# Patient Record
Sex: FEMALE | Race: WHITE | NOT HISPANIC OR LATINO | Employment: OTHER | URBAN - METROPOLITAN AREA
[De-identification: names, ages, dates, MRNs, and addresses within clinical notes are randomized per-mention and may not be internally consistent; named-entity substitution may affect disease eponyms.]

---

## 2022-11-07 ENCOUNTER — OUTSIDE PLACE OF SERVICE (OUTPATIENT)
Dept: SURGERY | Facility: CLINIC | Age: 82
End: 2022-11-07
Payer: MEDICARE

## 2022-11-07 ENCOUNTER — OUTSIDE PLACE OF SERVICE (OUTPATIENT)
Dept: SURGERY | Facility: CLINIC | Age: 82
End: 2022-11-07

## 2022-11-07 PROCEDURE — 44005 FREEING OF BOWEL ADHESION: CPT | Mod: ,,, | Performed by: SURGERY

## 2022-11-07 PROCEDURE — 99222 PR INITIAL HOSPITAL CARE,LEVL II: ICD-10-PCS | Mod: 57,,, | Performed by: SURGERY

## 2022-11-07 PROCEDURE — 99222 1ST HOSP IP/OBS MODERATE 55: CPT | Mod: 57,,, | Performed by: SURGERY

## 2022-11-07 PROCEDURE — 44005 PR FREEING BOWEL ADHESION,ENTEROLYSIS: ICD-10-PCS | Mod: ,,, | Performed by: SURGERY

## 2022-11-08 ENCOUNTER — OUTSIDE PLACE OF SERVICE (OUTPATIENT)
Dept: PULMONOLOGY | Facility: CLINIC | Age: 82
End: 2022-11-08
Payer: MEDICARE

## 2022-11-08 PROCEDURE — 99291 PR CRITICAL CARE, E/M 30-74 MINUTES: ICD-10-PCS | Mod: ,,, | Performed by: INTERNAL MEDICINE

## 2022-11-08 PROCEDURE — 99291 CRITICAL CARE FIRST HOUR: CPT | Mod: ,,, | Performed by: INTERNAL MEDICINE

## 2022-11-09 ENCOUNTER — OUTSIDE PLACE OF SERVICE (OUTPATIENT)
Dept: PULMONOLOGY | Facility: CLINIC | Age: 82
End: 2022-11-09
Payer: MEDICARE

## 2022-11-09 PROCEDURE — 99233 PR SUBSEQUENT HOSPITAL CARE,LEVL III: ICD-10-PCS | Mod: ,,, | Performed by: INTERNAL MEDICINE

## 2022-11-09 PROCEDURE — 99233 SBSQ HOSP IP/OBS HIGH 50: CPT | Mod: ,,, | Performed by: INTERNAL MEDICINE

## 2023-03-05 ENCOUNTER — TELEPHONE (OUTPATIENT)
Dept: GASTROENTEROLOGY | Facility: CLINIC | Age: 83
End: 2023-03-05

## 2023-03-05 NOTE — TELEPHONE ENCOUNTER
Add on to 6/7/2023 Wednesday OV with me at 2:15pm. Notify patient. No prior GI records in [facility] or [facility].    Will evaluate for MRI with pancreatic and MRCP protocols given dilated bile ducts and prior abnormal LFT.  NBP

## 2023-04-21 ENCOUNTER — OUTSIDE PLACE OF SERVICE (OUTPATIENT)
Dept: PULMONOLOGY | Facility: CLINIC | Age: 83
End: 2023-04-21
Payer: MEDICARE

## 2023-04-21 PROCEDURE — 99223 PR INITIAL HOSPITAL CARE,LEVL III: ICD-10-PCS | Mod: ,,, | Performed by: INTERNAL MEDICINE

## 2023-04-21 PROCEDURE — 99223 1ST HOSP IP/OBS HIGH 75: CPT | Mod: ,,, | Performed by: INTERNAL MEDICINE

## 2023-04-24 ENCOUNTER — OUTSIDE PLACE OF SERVICE (OUTPATIENT)
Dept: PULMONOLOGY | Facility: CLINIC | Age: 83
End: 2023-04-24
Payer: MEDICARE

## 2023-04-24 PROCEDURE — 99291 CRITICAL CARE FIRST HOUR: CPT | Mod: ,,, | Performed by: INTERNAL MEDICINE

## 2023-04-24 PROCEDURE — 99291 PR CRITICAL CARE, E/M 30-74 MINUTES: ICD-10-PCS | Mod: ,,, | Performed by: INTERNAL MEDICINE

## 2023-04-25 ENCOUNTER — OUTSIDE PLACE OF SERVICE (OUTPATIENT)
Dept: PULMONOLOGY | Facility: CLINIC | Age: 83
End: 2023-04-25
Payer: MEDICARE

## 2023-04-25 PROCEDURE — 99233 SBSQ HOSP IP/OBS HIGH 50: CPT | Mod: FS,,, | Performed by: INTERNAL MEDICINE

## 2023-04-25 PROCEDURE — 99233 PR SUBSEQUENT HOSPITAL CARE,LEVL III: ICD-10-PCS | Mod: FS,,, | Performed by: INTERNAL MEDICINE

## 2023-04-26 ENCOUNTER — OUTSIDE PLACE OF SERVICE (OUTPATIENT)
Dept: PULMONOLOGY | Facility: CLINIC | Age: 83
End: 2023-04-26
Payer: MEDICARE

## 2023-04-26 PROCEDURE — 99232 SBSQ HOSP IP/OBS MODERATE 35: CPT | Mod: FS,,, | Performed by: INTERNAL MEDICINE

## 2023-04-26 PROCEDURE — 99232 PR SUBSEQUENT HOSPITAL CARE,LEVL II: ICD-10-PCS | Mod: FS,,, | Performed by: INTERNAL MEDICINE

## 2024-02-29 ENCOUNTER — TELEPHONE (OUTPATIENT)
Dept: UROLOGY | Facility: CLINIC | Age: 84
End: 2024-02-29
Payer: MEDICARE

## 2024-03-04 ENCOUNTER — OFFICE VISIT (OUTPATIENT)
Dept: UROLOGY | Facility: CLINIC | Age: 84
End: 2024-03-04
Payer: MEDICARE

## 2024-03-04 VITALS
HEART RATE: 85 BPM | TEMPERATURE: 98 F | SYSTOLIC BLOOD PRESSURE: 150 MMHG | WEIGHT: 137 LBS | OXYGEN SATURATION: 98 % | DIASTOLIC BLOOD PRESSURE: 84 MMHG | HEIGHT: 62 IN | BODY MASS INDEX: 25.21 KG/M2

## 2024-03-04 DIAGNOSIS — N28.89 DILATION OF RENAL COLLECTION SYSTEM: ICD-10-CM

## 2024-03-04 DIAGNOSIS — N28.1 RENAL CYST: Primary | ICD-10-CM

## 2024-03-04 DIAGNOSIS — R35.0 URINARY FREQUENCY: ICD-10-CM

## 2024-03-04 DIAGNOSIS — N13.30 HYDRONEPHROSIS, UNSPECIFIED HYDRONEPHROSIS TYPE: ICD-10-CM

## 2024-03-04 DIAGNOSIS — R35.1 NOCTURIA: ICD-10-CM

## 2024-03-04 LAB
BILIRUBIN, UA POC OHS: NEGATIVE
BLOOD, UA POC OHS: ABNORMAL
CLARITY, UA POC OHS: CLEAR
COLOR, UA POC OHS: YELLOW
GLUCOSE, UA POC OHS: NEGATIVE
KETONES, UA POC OHS: NEGATIVE
LEUKOCYTES, UA POC OHS: NEGATIVE
NITRITE, UA POC OHS: NEGATIVE
PH, UA POC OHS: 7
PROTEIN, UA POC OHS: NEGATIVE
SPECIFIC GRAVITY, UA POC OHS: 1.01
UROBILINOGEN, UA POC OHS: 0.2

## 2024-03-04 PROCEDURE — 99204 OFFICE O/P NEW MOD 45 MIN: CPT | Mod: S$GLB,,, | Performed by: NURSE PRACTITIONER

## 2024-03-04 PROCEDURE — 81003 URINALYSIS AUTO W/O SCOPE: CPT | Mod: QW,S$GLB,, | Performed by: NURSE PRACTITIONER

## 2024-03-04 RX ORDER — SUCRALFATE 1 G/10ML
1 SUSPENSION ORAL 4 TIMES DAILY
COMMUNITY
End: 2024-03-28

## 2024-03-04 RX ORDER — FLUTICASONE PROPIONATE 50 MCG
1 SPRAY, SUSPENSION (ML) NASAL DAILY
COMMUNITY
End: 2024-03-28

## 2024-03-04 RX ORDER — PANTOPRAZOLE SODIUM 40 MG/1
40 TABLET, DELAYED RELEASE ORAL DAILY
COMMUNITY
End: 2024-03-28

## 2024-03-04 RX ORDER — SPIRONOLACTONE 25 MG/1
25 TABLET ORAL DAILY
COMMUNITY
End: 2024-03-28

## 2024-03-04 RX ORDER — ALBUTEROL SULFATE 0.63 MG/3ML
0.63 SOLUTION RESPIRATORY (INHALATION) EVERY 6 HOURS PRN
COMMUNITY
End: 2024-05-06

## 2024-03-04 RX ORDER — SERTRALINE HYDROCHLORIDE 25 MG/1
50 TABLET, FILM COATED ORAL DAILY
COMMUNITY

## 2024-03-04 RX ORDER — FUROSEMIDE 20 MG/1
20 TABLET ORAL 2 TIMES DAILY
COMMUNITY

## 2024-03-04 RX ORDER — VALSARTAN 160 MG/1
80 TABLET ORAL DAILY
COMMUNITY

## 2024-03-04 RX ORDER — MONTELUKAST SODIUM 10 MG/1
10 TABLET ORAL NIGHTLY
COMMUNITY

## 2024-03-04 RX ORDER — ATORVASTATIN CALCIUM 40 MG/1
40 TABLET, FILM COATED ORAL DAILY
COMMUNITY

## 2024-03-04 RX ORDER — BUDESONIDE 0.5 MG/2ML
0.5 INHALANT ORAL DAILY
COMMUNITY
End: 2024-05-06

## 2024-03-04 RX ORDER — CLONIDINE 0.1 MG/24H
1 PATCH, EXTENDED RELEASE TRANSDERMAL
COMMUNITY
End: 2024-03-28

## 2024-03-04 RX ORDER — NAPROXEN SODIUM 220 MG/1
81 TABLET, FILM COATED ORAL DAILY
COMMUNITY

## 2024-03-04 RX ORDER — LEVOTHYROXINE SODIUM 25 UG/1
25 TABLET ORAL
COMMUNITY

## 2024-03-04 RX ORDER — BENZONATATE 200 MG/1
200 CAPSULE ORAL 3 TIMES DAILY PRN
COMMUNITY
End: 2024-03-28

## 2024-03-04 RX ORDER — METOCLOPRAMIDE 10 MG/1
10 TABLET ORAL 4 TIMES DAILY
COMMUNITY
End: 2024-03-28

## 2024-03-04 RX ORDER — CARBAMAZEPINE 100 MG/1
100 TABLET, EXTENDED RELEASE ORAL 2 TIMES DAILY
COMMUNITY

## 2024-03-04 RX ORDER — AMLODIPINE BESYLATE 2.5 MG/1
2.5 TABLET ORAL DAILY
COMMUNITY

## 2024-03-04 RX ORDER — MIRABEGRON 25 MG/1
25 TABLET, FILM COATED, EXTENDED RELEASE ORAL DAILY
COMMUNITY

## 2024-03-04 RX ORDER — MAGNESIUM SULFATE 100 %
CRYSTALS MISCELLANEOUS
COMMUNITY
End: 2024-03-28

## 2024-03-12 ENCOUNTER — OUTSIDE PLACE OF SERVICE (OUTPATIENT)
Dept: GASTROENTEROLOGY | Facility: CLINIC | Age: 84
End: 2024-03-12
Payer: MEDICARE

## 2024-03-12 PROCEDURE — 99223 1ST HOSP IP/OBS HIGH 75: CPT | Mod: ,,, | Performed by: INTERNAL MEDICINE

## 2024-03-13 ENCOUNTER — OUTSIDE PLACE OF SERVICE (OUTPATIENT)
Dept: GASTROENTEROLOGY | Facility: CLINIC | Age: 84
End: 2024-03-13
Payer: MEDICARE

## 2024-03-13 PROCEDURE — 43235 EGD DIAGNOSTIC BRUSH WASH: CPT | Mod: ,,, | Performed by: INTERNAL MEDICINE

## 2024-03-14 ENCOUNTER — TELEPHONE (OUTPATIENT)
Dept: GASTROENTEROLOGY | Facility: CLINIC | Age: 84
End: 2024-03-14
Payer: MEDICARE

## 2024-03-14 RX ORDER — PANCRELIPASE LIPASE, PANCRELIPASE PROTEASE, PANCRELIPASE AMYLASE 10000; 32000; 42000 [USP'U]/1; [USP'U]/1; [USP'U]/1
1 CAPSULE, DELAYED RELEASE ORAL
Qty: 90 CAPSULE | Refills: 0 | Status: SHIPPED | OUTPATIENT
Start: 2024-03-14 | End: 2024-04-04 | Stop reason: SDUPTHER

## 2024-03-14 NOTE — TELEPHONE ENCOUNTER
----- Message from Ileana Lundberg sent at 3/14/2024  3:27 PM CDT -----  Type:  Needs Medical Advice    Who Called: Rochelle Mckeon ( pt's dtr, Madhavi)     Symptoms (please be specific): -   How long has patient had these symptoms:  -  Pharmacy name and phone #:  -  Would the patient rather a call back or a response via [facility]?    Best Call Back Number: [phone]  Additional Information: needs to speak w/ nurse about a medication that pt was d/c'with but it hasn't been called into pharmacy please call to advise

## 2024-03-14 NOTE — TELEPHONE ENCOUNTER
I called and spoke to patients daughter regarding follow up with Dr. Paz. Patient daughter states patient is ok at the present moment. Please advise when you would like for patient to be scheduled and with which provider?

## 2024-03-14 NOTE — TELEPHONE ENCOUNTER
Pt given Zenpep (20,000) in hospital. Was not sent home with a prescription for it though. Pt prefers [facility], but her daughter did say if it is a medication that isnt commonly kept in the pharmacy then can send to [facility] in [place]. Both added to pts chart. -KG

## 2024-03-14 NOTE — TELEPHONE ENCOUNTER
Make 2 weeks follow up OV with NP.  Zenpep 10,000 sent to [facility]. Not sure if they have on supply. If not, then [facility]. Okay to  samples to try up until NP follow up OV if preferred if we have any samples.   NBP

## 2024-03-14 NOTE — TELEPHONE ENCOUNTER
----- Message from Jeannine Goel sent at 3/14/2024 12:39 PM CDT -----  Regarding: Appointment  Contact: [facility]  Per phone call with Carin, the patient was discharge on 03/14/2024 and the patient needs to be schedule for a hospital follow up.  Please return call at [phone] (home).    Thanks,  SJ

## 2024-03-18 ENCOUNTER — TELEPHONE (OUTPATIENT)
Dept: GASTROENTEROLOGY | Facility: CLINIC | Age: 84
End: 2024-03-18

## 2024-03-18 ENCOUNTER — TELEPHONE (OUTPATIENT)
Dept: GASTROENTEROLOGY | Facility: CLINIC | Age: 84
End: 2024-03-18
Payer: MEDICARE

## 2024-03-18 NOTE — TELEPHONE ENCOUNTER
Patients mother was calling to ask if there is anything she can take for diarrhea. Patient stated they did  prescription from the pharmacy. 3/18/24 LRA         ----- Message from Jeannine Goel sent at 3/18/2024  3:43 PM CDT -----  Regarding: Return Call  Contact: Madhavi, Daughter  Type:  Patient Returning Call    Who Called:Madhavi  Who Left Message for Patient: Jessica   Does the patient know what this is regarding?: medication   Would the patient rather a call back or a response via [facility]?  Call back   Best Call Back Number: [phone] (home)     Additional Information:     CLAUDIO Carr

## 2024-03-18 NOTE — TELEPHONE ENCOUNTER
Was patient able to  Zenpep from pharmacy? Medication is to be taken with meals and should help diarrhea. We can see if we have samples if she did not  from pharmacy.  Shruthi

## 2024-03-18 NOTE — TELEPHONE ENCOUNTER
----- Message from Jeannine Goel sent at 3/18/2024  8:31 AM CDT -----  Regarding: Medication  Contact: Madhavi,daughter  Per phone call with Madhavi, she stated that she was released from the hospital on Thursday and she is still diarrhea and taking the Immodiaun and she wants to know if something else can be called in.  Please return call at [phone] (home).    Thanks,  SJ

## 2024-03-22 ENCOUNTER — TELEPHONE (OUTPATIENT)
Dept: UROLOGY | Facility: CLINIC | Age: 84
End: 2024-03-22
Payer: MEDICARE

## 2024-03-22 NOTE — TELEPHONE ENCOUNTER
Confirm that she is taking zenpep with meals. And that she has already tried OTC imodium. Would also recommend stopping metoclopramide (Reglan) if not already.  If she has tried all above she may come by and have samples of Xifaxan. Can provide 6 days worth. She will take 1 tablet 3 times daily. Keep OV next week.  KN

## 2024-03-22 NOTE — TELEPHONE ENCOUNTER
"Spoke with patient's daughter (Madhavi) in regards to the patient's renal scan. Patient was unable to make the scheduled appointment due to being hospitalized r/t GI issues. Syed states " I wanted to call and update ya'll so we didn't leave yall high and dry with no communication, but as soon as she's feeling better we will get this scan scheduled and done" Encouraged patient to contact the clinic with any questions or concerns, if they need an order resent to please let the clinic know. Patient verbalized understanding.         ----- Message from China Kulkarni sent at 3/22/2024 10:27 AM CDT -----  Contact: Daughter/Madhavi  Patients daughter is calling to state pt had a renal scan scheduled but couldn't make it. Pt was hospitalized and is now home. Can be contacted at [phone] to reschedule.     "

## 2024-03-25 NOTE — TELEPHONE ENCOUNTER
Called patient and she stated she has tried everything above. Patient stated she will come by office to get Xifaxan. Patient understands how to take medication. 3/25/24 LRA

## 2024-03-28 ENCOUNTER — OFFICE VISIT (OUTPATIENT)
Dept: GASTROENTEROLOGY | Facility: CLINIC | Age: 84
End: 2024-03-28
Payer: MEDICARE

## 2024-03-28 VITALS
HEART RATE: 83 BPM | WEIGHT: 131.19 LBS | HEIGHT: 62 IN | OXYGEN SATURATION: 95 % | RESPIRATION RATE: 18 BRPM | DIASTOLIC BLOOD PRESSURE: 72 MMHG | BODY MASS INDEX: 24.14 KG/M2 | SYSTOLIC BLOOD PRESSURE: 122 MMHG

## 2024-03-28 DIAGNOSIS — K83.8 INTRAHEPATIC BILE DUCT DILATION: ICD-10-CM

## 2024-03-28 DIAGNOSIS — R63.4 WEIGHT LOSS: ICD-10-CM

## 2024-03-28 DIAGNOSIS — R93.3 IMAGING OF GASTROINTESTINAL TRACT ABNORMAL: ICD-10-CM

## 2024-03-28 DIAGNOSIS — R11.2 NAUSEA AND VOMITING, UNSPECIFIED VOMITING TYPE: ICD-10-CM

## 2024-03-28 DIAGNOSIS — K31.84 GASTROPARESIS: ICD-10-CM

## 2024-03-28 DIAGNOSIS — K83.8 COMMON BILE DUCT DILATATION: ICD-10-CM

## 2024-03-28 DIAGNOSIS — Z98.84 H/O GASTRIC BYPASS: ICD-10-CM

## 2024-03-28 DIAGNOSIS — R19.7 DIARRHEA, UNSPECIFIED TYPE: Primary | ICD-10-CM

## 2024-03-28 PROCEDURE — 99215 OFFICE O/P EST HI 40 MIN: CPT | Mod: S$GLB,,,

## 2024-03-28 RX ORDER — PANTOPRAZOLE SODIUM 40 MG/1
40 TABLET, DELAYED RELEASE ORAL 2 TIMES DAILY
Qty: 180 TABLET | Refills: 2 | Status: SHIPPED | OUTPATIENT
Start: 2024-03-28

## 2024-03-28 NOTE — LETTER
March 28, 2024        Leonardo Raman MD  [address]  [place] [address]             [place] [facility]  [address] [address]  Phone: [phone]  Fax: [phone]   Patient: Rochelle Mckeon   MR Number: [id number]   YOB: 1940   Date of Visit: 3/28/2024       Dear Dr. Raman:    Thank you for referring Rochelle Mckeon to me for evaluation. Attached you will find relevant portions of my assessment and plan of care.    If you have questions, please do not hesitate to call me. I look forward to following Rochelle Mckeon along with you.    Sincerely,      Olivia Hernandez, NP            CC  No Recipients    Enclosure

## 2024-03-28 NOTE — PATIENT INSTRUCTIONS
Begin taking pantoprazole 40 mg twice a day.   Try taking Carafate before meals and at bedtime.  Continue taking Zenpep but take 2 capsules with each meal.  If you get constipated, begin taking MiraLAX. Start with 1/2 capful daily, and titrate dose up or down until you are having daily, soft bowel movements.     Please notify my office if you have not been contacted within two weeks after any procedures, submitting any samples (biopsies, blood, stool, urine, etc.) or after any imaging (X-ray, CT, MRI, etc.).

## 2024-03-28 NOTE — PROGRESS NOTES
Clinic Note    Reason for visit:  The primary encounter diagnosis was Diarrhea, unspecified type. Diagnoses of Nausea and vomiting, unspecified vomiting type, Gastroparesis, Weight loss, Intrahepatic bile duct dilation, Common bile duct dilatation, Imaging of gastrointestinal tract abnormal, and H/O gastric bypass were also pertinent to this visit.    PCP: Leonardo Raman.       HPI:  This is a 83 y.o. female who was seen as inpatient by Dr. Paz 3/12/2024 for N/V and diarrhea. Daughter present during visit. She has been taking Zenpep since discharge and took Xifaxan TID for 6 days and she states her stools are no longer loose. Bowel movements seem to be under control. Her main complaint is nausea, which has been for the last 30 years. She is not vomiting but wakes up nauseous and is nauseous all day. She does not have an appetite. She has Zofran at home but doesn't seem to help. She takes a lot of medicine at night and patients daughter had her try taking them with an Ensure. Patient's daughter feels she is depressed due to  passing and being sick which is making symptoms. She eats the same thing daily, usually 2 pieces of toast for breakfast then half a sandwich for lunch and whatever her daughter cooks for supper. She gets full quickly. Denies dysphagia. She was previously on pantoprazole 40 mg daily but this was stopped while inpatient. Her daughter states in the past she would get constipated then go to diarrhea.     In review of Epic records- she has taken Bentyl, Dexilant, Trulance, and GI cocktail in the past.     She states she took Carafate two days and had diarrhea so she stopped it and is too scared to try it again.    She was on Imodium TID but she stopped this.      She was to have fecal elastase checked while inpatient but does not appear it was done.      She has h/o gastroparesis and had gastric bypass and has had multiple complications since that time. She had small bowel obstruction  with surgery intervention in 11/2022. Last colonoscopy possibly in 2018, unsure of date. Patient's daughter trying to get records.       CT AP IV 3/11/2024: stable intrahepatic/extrahepatic biliary ductal dilatation. Diverticulosis. Thickening of sigmoid. Possible ileus. Prominent main portal vein, fatty liver.    UGIS 5/12/2023: Postsurgical changes from a gastrojejunostomy with a widely patent surgical anastomosis. A complete small bowel follow-through could not be performed due to the patient's inability to ingest additional contrast. Esophageal dysmotility with dissipation of the primary peristaltic wave.       3/12/2024 GI stool PCR panel/C diff neg, fecal fat nl, CBC nl, 39H/45/132H, alb 3.2L, Tp 6.3L, CMP onl, TSH/FT4 nl, positive stool lactoferrin.  3/11/2024: CRP 0.60H, 84H/69H/170H, CMP onl, Lipase 102H (13-75), PT/INR/CBC nl    She has diarrhea and was given Zenpep at discharge and this has not helped her diarrhea.     Did not feel she could tolerate a prep for a colonoscopy.   She had EGD w/no structural source of anemia.  Suspected her loose stools are related to her bypass anatomy.  Would hope to avoid any further intervention such as colonoscopy if able in high-risk for complications related to her adhesions and comorbidities.     EGD 3/13/2024: Normal esophageal mucosa, esophagogastric junction, and proximal stomach. Surgically altered anatomy consistent with a prior gastroenteric anastomosis that has been closed with a 2nd patent gastroenteric anastomosis that appears to have an enteroenteric anastomosis just beyond the gastric lumen.  All appears well healed with normal overlying mucosa.    Records brought in by patient:  CT abd IV 2015: Fluid-filled thin-walled oversewn duodenal bulb noted. General dilatation of central biliary tree. Enlarged CBD visualized to ampulla region w/o evidence of CBD mass or stone. Postsurgical changes of stomach.     Small Bowel Series 2014: no evidence of small bowel  obstructive process. Abnormal appearance to RLQ distal ileal loops w/findings suggestive of tethering or scar.     CT Chest/Abd/Pelvis IV 8/2011: pulmonary nodule and mild patchy infiltrates of lung, Post op changes of distal stomach. No GB. Chronic changes of pancreas. Biliary prominence noted. Fatty liver. Diverticulosis w/o inflammation.     EGD with dilation of gastric outlet 2006: 10 mm opening in gastric outlet s/p balloon dilation to 20 mm. 2 weeks prior gastric outlet opening was 5-6 mm s/p dilation. Less edema than prior.     Review of Systems   Constitutional:  Positive for fatigue. Negative for fever and unexpected weight change.   HENT:  Negative for mouth sores, postnasal drip, sore throat and trouble swallowing.    Eyes:  Negative for pain, discharge and eye dryness.   Respiratory:  Positive for cough and shortness of breath. Negative for apnea, choking, chest tightness and wheezing.    Cardiovascular:  Negative for chest pain, palpitations and leg swelling.   Gastrointestinal:  Positive for change in bowel habit, constipation, diarrhea and nausea. Negative for abdominal distention, abdominal pain, anal bleeding, blood in stool, rectal pain, vomiting, reflux and fecal incontinence.   Genitourinary:  Negative for bladder incontinence, dysuria and hematuria.   Musculoskeletal:  Positive for back pain. Negative for arthralgias and joint swelling.   Integumentary:  Negative for color change and rash.   Allergic/Immunologic: Negative for environmental allergies and food allergies.   Neurological:  Negative for seizures and headaches.   Hematological:  Negative for adenopathy. Bruises/bleeds easily.        Past Medical History:   Diagnosis Date    Allergies     Aortic stenosis     CHF (congestive heart failure)     Gastroparesis     GERD (gastroesophageal reflux disease)     Heart disease     History of pulmonary embolism     History of small bowel obstruction     HTN (hypertension)     Hypoxia     Mixed  hyperlipidemia     Pneumonia, unspecified organism     Respiratory failure      Past Surgical History:   Procedure Laterality Date    ABDOMINAL ADHESION SURGERY      APPENDECTOMY      BLADDER SUSPENSION      CHOLECYSTECTOMY      EXPLORATORY LAPAROTOMY      closure of small bowel perforation    GASTRIC BYPASS      HYSTERECTOMY      REPAIR, LACERATION, ABDOMEN      SHOULDER OPEN ROTATOR CUFF REPAIR      VAGOTOMY  2006     History reviewed. No pertinent family history.  Social History     Tobacco Use    Smoking status: Never     Passive exposure: Never    Smokeless tobacco: Never   Substance Use Topics    Alcohol use: Never    Drug use: Never     Review of patient's allergies indicates:   Allergen Reactions    Codeine Other (See Comments)     Hyper      Medication List with Changes/Refills   New Medications    PANTOPRAZOLE (PROTONIX) 40 MG TABLET    Take 1 tablet (40 mg total) by mouth 2 (two) times daily.   Current Medications    ALBUTEROL (ACCUNEB) 0.63 MG/3 ML NEBU    Take 0.63 mg by nebulization every 6 (six) hours as needed. Rescue    AMLODIPINE (NORVASC) 2.5 MG TABLET    Take 2.5 mg by mouth once daily.    APIXABAN (ELIQUIS) 2.5 MG TAB    Take by mouth 2 (two) times daily.    ASPIRIN 81 MG CHEW    Take 81 mg by mouth once daily.    ATORVASTATIN (LIPITOR) 40 MG TABLET    Take 40 mg by mouth once daily.    BUDESONIDE (PULMICORT) 0.5 MG/2 ML NEBULIZER SOLUTION    Take 0.5 mg by nebulization once daily. Controller    CARBAMAZEPINE (TEGRETOL XR) 100 MG 12 HR TABLET    Take 100 mg by mouth 2 (two) times daily.    FUROSEMIDE (LASIX) 20 MG TABLET    Take 20 mg by mouth 2 (two) times daily.    LEVOTHYROXINE (SYNTHROID) 25 MCG TABLET    Take 25 mcg by mouth before breakfast.    LIPASE-PROTEASE-AMYLASE (ZENPEP) 10,000-32,000 -42,000 UNIT CPDR    Take 1 capsule by mouth 3 (three) times daily with meals.    MIRABEGRON (MYRBETRIQ) 25 MG TB24 ER TABLET    Take 25 mg by mouth once daily.    MONTELUKAST (SINGULAIR) 10 MG TABLET    " Take 10 mg by mouth every evening.    SERTRALINE (ZOLOFT) 25 MG TABLET    Take 50 mg by mouth once daily.    VALSARTAN (DIOVAN) 160 MG TABLET    Take 160 mg by mouth once daily.   Discontinued Medications    BENZONATATE (TESSALON) 200 MG CAPSULE    Take 200 mg by mouth 3 (three) times daily as needed for Cough.    CLONIDINE 0.1 MG/24 HR TD PTWK (CATAPRES) 0.1 MG/24 HR    Place 1 patch onto the skin every 7 days.    FLUTICASONE PROPIONATE (FLONASE) 50 MCG/ACTUATION NASAL SPRAY    1 spray by Each Nostril route once daily.    MAGNESIUM SULFATE 100 % CRYSTALS    by Misc.(Non-Drug; Combo Route) route.    METOCLOPRAMIDE HCL (REGLAN) 10 MG TABLET    Take 10 mg by mouth 4 (four) times daily.    PANTOPRAZOLE (PROTONIX) 40 MG TABLET    Take 40 mg by mouth once daily.    SPIRONOLACTONE (ALDACTONE) 25 MG TABLET    Take 25 mg by mouth once daily.    SUCRALFATE (CARAFATE) 100 MG/ML SUSPENSION    Take 1 g by mouth 4 (four) times daily.         Vital Signs:  /72 (BP Location: Left arm, Patient Position: Sitting, BP Method: Medium (Automatic))   Pulse 83   Resp 18   Ht 5' 2" (1.575 m)   Wt 59.5 kg (131 lb 3.2 oz)   SpO2 95%   BMI 24.00 kg/m²        Physical Exam  Vitals reviewed.   Constitutional:       General: She is awake. She is not in acute distress.     Appearance: Normal appearance. She is well-developed. She is not ill-appearing, toxic-appearing or diaphoretic.   HENT:      Head: Normocephalic and atraumatic.      Nose: Nose normal.      Mouth/Throat:      Mouth: Mucous membranes are moist.      Pharynx: Oropharynx is clear. No oropharyngeal exudate or posterior oropharyngeal erythema.   Eyes:      General: Lids are normal. Gaze aligned appropriately. No scleral icterus.        Right eye: No discharge.         Left eye: No discharge.      Conjunctiva/sclera: Conjunctivae normal.   Neck:      Trachea: Trachea normal.   Cardiovascular:      Rate and Rhythm: Normal rate and regular rhythm.      Pulses:           " Radial pulses are 2+ on the right side and 2+ on the left side.   Pulmonary:      Effort: Pulmonary effort is normal. No respiratory distress.      Breath sounds: No stridor. No wheezing.   Chest:      Chest wall: No tenderness.   Abdominal:      General: Bowel sounds are normal. There is no distension.      Palpations: Abdomen is soft. There is no fluid wave, hepatomegaly or mass.      Tenderness: There is no abdominal tenderness. There is no guarding or rebound.   Musculoskeletal:         General: No tenderness or deformity.      Cervical back: Full passive range of motion without pain and neck supple. No tenderness.      Right lower leg: No edema.      Left lower leg: No edema.   Lymphadenopathy:      Cervical: No cervical adenopathy.   Skin:     General: Skin is warm and dry.      Capillary Refill: Capillary refill takes less than 2 seconds.      Coloration: Skin is not cyanotic, jaundiced or pale.   Neurological:      General: No focal deficit present.      Mental Status: She is alert and oriented to person, place, and time.      Motor: Tremor (mild bilateral upper extremity) present.   Psychiatric:         Attention and Perception: Attention normal.         Mood and Affect: Mood and affect normal.         Speech: Speech normal.         Behavior: Behavior normal. Behavior is cooperative.            All of the data above and below has been reviewed by myself and any further interpretations will be reflected in the assessment and plan.   The data includes review of external notes, and independent interpretation of lab results, procedures, x-rays, and imaging reports.      Assessment:  Diarrhea, unspecified type    Nausea and vomiting, unspecified vomiting type    Gastroparesis    Weight loss    Intrahepatic bile duct dilation    Common bile duct dilatation    Imaging of gastrointestinal tract abnormal    H/O gastric bypass    Other orders  -     pantoprazole (PROTONIX) 40 MG tablet; Take 1 tablet (40 mg total) by  mouth 2 (two) times daily.  Dispense: 180 tablet; Refill: 2    Suspected her loose stools are related to her bypass anatomy.  Would hope to avoid any further intervention such as colonoscopy if able in high-risk for complications related to her adhesions and comorbidities. She also does not believe she could tolerate the prep for a colonoscopy at this time. Her diarrhea seems to be under control with Zenpep. She will increase Zenpep to 2 capsules at a time instead of 1.   She will try Carafate again.   Nausea likely related to gastroparesis and prior surgeries. Zenpep has not helped the nausea. She will begin taking pantoprazole 40 BID. Will review with Dr. Paz.   Encouraged her to supplement meals with Ensure/Boost.     Recommendations:  Begin taking pantoprazole 40 mg twice a day.   Try taking Carafate before meals and at bedtime.  Continue taking Zenpep but take 2 capsules with each meal.   If you get constipated, begin taking MiraLAX. Start with 1/2 capful daily, and titrate dose up or down until you are having daily, soft bowel movements.     Risks, benefits, and alternatives of medical management, any associated procedures, and/or treatment discussed with the patient. Patient given opportunity to ask questions and voices understanding. Patient has elected to proceed with the recommended care modalities as discussed.    Follow up with Dr. Paz.     Order summary:  No orders of the defined types were placed in this encounter.       Instructed patient to notify my office if they have not been contacted within two weeks after any procedures, submitting any samples (biopsies, blood, stool, urine, etc.) or after any imaging (X-ray, CT, MRI, etc.).      Olivia Hernandez NP    This document may have been created using a voice recognition transcribing system. Incorrect words or phrases may have been missed during proofreading. Please interpret accordingly or contact me for clarification.

## 2024-04-08 RX ORDER — PANCRELIPASE LIPASE, PANCRELIPASE PROTEASE, PANCRELIPASE AMYLASE 10000; 32000; 42000 [USP'U]/1; [USP'U]/1; [USP'U]/1
1 CAPSULE, DELAYED RELEASE ORAL
Qty: 90 CAPSULE | Refills: 0 | Status: CANCELLED | OUTPATIENT
Start: 2024-04-08

## 2024-04-08 NOTE — TELEPHONE ENCOUNTER
----- Message from Jeannine Goel sent at 2024  9:00 AM CDT -----  Regarding: Refill  Contact: Madhavi,daughter  Type:  RX Refill Request    Who Called: Madhavi   Refill or New Rx: new refill   RX Name and Strength: lipase-protease-amylase (ZENPEP) 10,000-32,000 -42,000 unit CpDR  How is the patient currently taking it? (ex. 1XDay):daily  Is this a 30 day or 90 day RX:90  Preferred Pharmacy with phone number:  [facility]/pharmacy #[id number] - [place], [place] - [address] AT [facility]  [address]  [place] [address]  Phone: [phone] Fax: [phone]      Local or Mail Order:local   Ordering Provider: Dr Domitila Paz   Would the patient rather a call back or a response via [facility]? Call back   Best Call Back Number: [phone] (home)     Additional Information: The prescription has     Thanks  SJ

## 2024-04-09 RX ORDER — PANCRELIPASE LIPASE, PANCRELIPASE PROTEASE, PANCRELIPASE AMYLASE 10000; 32000; 42000 [USP'U]/1; [USP'U]/1; [USP'U]/1
1 CAPSULE, DELAYED RELEASE ORAL
Qty: 90 CAPSULE | Refills: 0 | Status: SHIPPED | OUTPATIENT
Start: 2024-04-09 | End: 2024-04-09

## 2024-04-09 NOTE — TELEPHONE ENCOUNTER
I sent out Zenpep 25,000 lipase unit dose and she will take 1 capsule with each meal and 1 capsule with each snack. If she has the 10,000 lipase unit dose at home (which was the dose that was given to her previously), then she can take 2 of those capsules with each meal and snack.   Also, has she had any improvement in her symptoms since taking pantoprazole 40 mg twice a day? Did she try taking Carafate again?   Her nausea is most likely related to her prior surgeries and adhesions. She may need to try low residue diet.   May also consider MRI of her brain/brainstem to evaluate for central source of nausea if not completed before.     Low Residue Diet:   Avoid any food made with seeds, nuts, or raw or dried fruit.  Avoid whole-grain breads and cereals, purchase products made from refined white flour.  Do not eat raw fruits or vegetables and remove skins before cooking.  MLC

## 2024-04-10 NOTE — TELEPHONE ENCOUNTER
Pt daughter Madhavi came in to  samples of Zenpep 40,000 mg.. pt to take one w/meals and snacks  per [facility]...  darcin

## 2024-04-10 NOTE — TELEPHONE ENCOUNTER
Staff called back spoke to Madhavi pt daughter.. she advised the Zenpep is working and asking if we have any samples?  The pharmacy have to order it and she's having diarrhea now because she's out.     She also stated pt had a MRI last hosp stay 5/2023.    Staff printed MRI report from 4/2023 and scanned into chart for review    Staff spoke to [facility] she advised pt can get Pancreatic Enzymes w/25,000 units of Lipase OTC until the pharmacy get some OR she can come to office and get samples.     Pt advised she will to by [facility] to see if they have the OTC medication and if not she will come to the office for samples.    [facility] notified..  --francois

## 2024-04-16 ENCOUNTER — PATIENT MESSAGE (OUTPATIENT)
Dept: GASTROENTEROLOGY | Facility: CLINIC | Age: 84
End: 2024-04-16
Payer: MEDICARE

## 2024-04-17 NOTE — TELEPHONE ENCOUNTER
She may take one of the 25,000 dose capsules with a 10,000 dose capsule, making 35,000 units per dose. Next time she needs a refill we can send the 40,000 unit dose.   MLC

## 2024-04-26 NOTE — PROGRESS NOTES
[facility] Records:  Previously on Dexilant 60 mg daily, Baclofen 10 mg BID, Vistaril 10 mg TID, Tigan 300 mg prn, Linzess 145 mcg, and GI cocktail+Donnatal prn. Linzess 290 mcg caused diarrhea.   EGD w/Dr. Almanza 4/10/2024: Evidence of gastrojejunostomy found in gastric antrum, in prepyloric region of stomach and in pylorus. Healthy appearing patent anastomosis. Medium amount of food (residue) found in greater curvature of stomach. Gastroparesis from prior vagotomy. 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  EGD/Colonoscopy w/Dr. Almanza 3/10/2010: Previous Tanisha-En-Y gastroplasty with evidence of mild esophagitis. Suture granuloma noted in gastric antrum. Diffuse melanosis coli, liquid stool in entire examined colon, interfering with visualization. IH. Repeat colon in 10 yr. EBx foveolar oxyntic mucosa w/diffuse mod chr infl  MLC

## 2024-05-06 ENCOUNTER — OFFICE VISIT (OUTPATIENT)
Dept: UROLOGY | Facility: CLINIC | Age: 84
End: 2024-05-06
Payer: MEDICARE

## 2024-05-06 VITALS
BODY MASS INDEX: 23.92 KG/M2 | DIASTOLIC BLOOD PRESSURE: 74 MMHG | SYSTOLIC BLOOD PRESSURE: 138 MMHG | HEIGHT: 62 IN | WEIGHT: 130 LBS | RESPIRATION RATE: 12 BRPM | HEART RATE: 99 BPM

## 2024-05-06 DIAGNOSIS — N39.41 URGE INCONTINENCE: Primary | ICD-10-CM

## 2024-05-06 LAB
BILIRUBIN, UA POC OHS: NEGATIVE
BLOOD, UA POC OHS: ABNORMAL
CLARITY, UA POC OHS: CLEAR
COLOR, UA POC OHS: YELLOW
GLUCOSE, UA POC OHS: NEGATIVE
KETONES, UA POC OHS: NEGATIVE
LEUKOCYTES, UA POC OHS: NEGATIVE
NITRITE, UA POC OHS: NEGATIVE
PH, UA POC OHS: 7
PROTEIN, UA POC OHS: NEGATIVE
SPECIFIC GRAVITY, UA POC OHS: 1.02
UROBILINOGEN, UA POC OHS: 0.2

## 2024-05-06 PROCEDURE — 81003 URINALYSIS AUTO W/O SCOPE: CPT | Mod: QW,S$GLB,,

## 2024-05-06 PROCEDURE — 99214 OFFICE O/P EST MOD 30 MIN: CPT | Mod: S$GLB,,,

## 2024-05-06 RX ORDER — LOPERAMIDE HYDROCHLORIDE 2 MG/1
2 CAPSULE ORAL 4 TIMES DAILY PRN
COMMUNITY

## 2024-05-06 RX ORDER — MELATONIN 3 MG
3 CAPSULE ORAL NIGHTLY
COMMUNITY

## 2024-05-06 RX ORDER — CHOLECALCIFEROL (VITAMIN D3) 25 MCG
5000 TABLET ORAL DAILY
COMMUNITY

## 2024-05-06 RX ORDER — ONDANSETRON HYDROCHLORIDE 8 MG/1
8 TABLET, FILM COATED ORAL EVERY 8 HOURS PRN
COMMUNITY

## 2024-05-06 NOTE — PROGRESS NOTES
Subjective:       Patient ID: Rochelle Mckeon is a 83 y.o. female.    Chief Complaint: Cyst (fu)      HPI: A 3-year-old female established patient presents today for follow-up of medication dose adjustment.  Patient was previously on Myrbetric 25 mg however she was still having 2-3 episodes of nocturia at night with urge incontinence.  At her last visit she was provided with 50 mg Myrbetric samples however patient reports she noted there was no improvement with the dose adjustment so when she completed this sample she went back to taking her 25 mg tablets.  Today patient denies dysuria, hematuria, urinary frequency, odor, fever or chills.    Patient had a CT on 02/22/2024 due to abdominal pain.  CT shows bilateral renal cyst.  Also shows enlargement of the right renal pelvis which could be due to educated until UPJ stenosis or pelviectasis.  Last visit a nuclear med renal scan was ordered which showed Normal renal function with normal bilateral uptake and excretion. No evidence of obstruction.             Past Medical History:   Past Medical History:   Diagnosis Date    Allergies     Aortic stenosis     CHF (congestive heart failure)     Gastroparesis     GERD (gastroesophageal reflux disease)     Heart disease     History of pulmonary embolism     History of small bowel obstruction     HTN (hypertension)     Hypoxia     Mixed hyperlipidemia     Parkinson's disease     Pneumonia, unspecified organism     Respiratory failure        Past Surgical Historical:   Past Surgical History:   Procedure Laterality Date    ABDOMINAL ADHESION SURGERY      APPENDECTOMY      BLADDER SUSPENSION      CHOLECYSTECTOMY      EXPLORATORY LAPAROTOMY      closure of small bowel perforation    GASTRIC BYPASS      HYSTERECTOMY      REPAIR, LACERATION, ABDOMEN      SHOULDER OPEN ROTATOR CUFF REPAIR      VAGOTOMY  2006        Medications:   Medication List with Changes/Refills   Current Medications    AMLODIPINE (NORVASC) 2.5 MG TABLET    Take 2.5  mg by mouth once daily.    APIXABAN (ELIQUIS) 2.5 MG TAB    Take by mouth 2 (two) times daily.    ASPIRIN 81 MG CHEW    Take 81 mg by mouth once daily.    ATORVASTATIN (LIPITOR) 40 MG TABLET    Take 40 mg by mouth once daily.    BETA-CAROTENE,A,-VITS C,E/MINS (OCUVITE ORAL)    Take by mouth.    CARBAMAZEPINE (TEGRETOL XR) 100 MG 12 HR TABLET    Take 100 mg by mouth 2 (two) times daily.    FUROSEMIDE (LASIX) 20 MG TABLET    Take 20 mg by mouth 2 (two) times daily.    LEVOTHYROXINE (SYNTHROID) 25 MCG TABLET    Take 25 mcg by mouth before breakfast.    LIPASE-PROTEASE-AMYLASE (ZENPEP) 25,000-79,000- 105,000 UNIT CPDR    Take 1 capsule by mouth with the each meal and snack.    LOPERAMIDE (IMODIUM) 2 MG CAPSULE    Take 2 mg by mouth 4 (four) times daily as needed for Diarrhea.    MELATONIN 3 MG CAP    Take 3 mg by mouth every evening.    MIRABEGRON (MYRBETRIQ) 25 MG TB24 ER TABLET    Take 25 mg by mouth once daily.    MONTELUKAST (SINGULAIR) 10 MG TABLET    Take 10 mg by mouth every evening.    ONDANSETRON (ZOFRAN) 8 MG TABLET    Take 8 mg by mouth every 8 (eight) hours as needed for Nausea.    PANTOPRAZOLE (PROTONIX) 40 MG TABLET    Take 1 tablet (40 mg total) by mouth 2 (two) times daily.    SERTRALINE (ZOLOFT) 25 MG TABLET    Take 50 mg by mouth once daily.    VALSARTAN (DIOVAN) 160 MG TABLET    Take 80 mg by mouth once daily.    VITAMIN D (VITAMIN D3) 1000 UNITS TAB    Take 5,000 Units by mouth once daily.   Discontinued Medications    ALBUTEROL (ACCUNEB) 0.63 MG/3 ML NEBU    Take 0.63 mg by nebulization every 6 (six) hours as needed. Rescue    BUDESONIDE (PULMICORT) 0.5 MG/2 ML NEBULIZER SOLUTION    Take 0.5 mg by nebulization once daily. Controller        Past Social History:   Social History     Socioeconomic History    Marital status:    Tobacco Use    Smoking status: Never     Passive exposure: Never    Smokeless tobacco: Never   Substance and Sexual Activity    Alcohol use: Never    Drug use: Never     Sexual activity: Not Currently       Allergies:   Review of patient's allergies indicates:   Allergen Reactions    Codeine Other (See Comments)     Hyper        Family History:   Family History   Problem Relation Name Age of Onset    Hypertension Father      Lung cancer Brother      Colon cancer Brother      Lung cancer Brother      Breast cancer Sister          Review of Systems:  Review of Systems   Constitutional:  Negative for activity change, appetite change, chills, diaphoresis, fatigue, fever and unexpected weight change.   HENT:  Negative for congestion, dental problem, drooling, ear discharge, ear pain, facial swelling, hearing loss, mouth sores, nosebleeds, postnasal drip, rhinorrhea, sinus pressure, sinus pain, sneezing, sore throat, tinnitus, trouble swallowing and voice change.    Eyes:  Negative for photophobia, pain, discharge, redness, itching and visual disturbance.   Respiratory:  Negative for apnea, cough, choking, chest tightness, shortness of breath, wheezing and stridor.    Cardiovascular:  Negative for chest pain and leg swelling.   Gastrointestinal:  Negative for abdominal distention, abdominal pain, anal bleeding, blood in stool, constipation, diarrhea, nausea, rectal pain and vomiting.   Endocrine: Negative for cold intolerance, heat intolerance, polydipsia, polyphagia and polyuria.   Genitourinary:  Positive for urgency. Negative for decreased urine volume, difficulty urinating, dyspareunia, dysuria, enuresis, flank pain, frequency, genital sores, hematuria, menstrual problem, pelvic pain, vaginal bleeding, vaginal discharge and vaginal pain.        Urge incontinence, nocturia 2-3 times per night   Musculoskeletal:  Negative for arthralgias, back pain, gait problem, joint swelling, myalgias, neck pain and neck stiffness.   Skin:  Negative for color change, pallor, rash and wound.   Allergic/Immunologic: Negative for environmental allergies, food allergies and immunocompromised state.    Neurological:  Negative for dizziness, tremors, seizures, syncope, facial asymmetry, speech difficulty, weakness, light-headedness, numbness and headaches.   Hematological:  Negative for adenopathy. Does not bruise/bleed easily.   Psychiatric/Behavioral:  Negative for agitation, behavioral problems, confusion, decreased concentration, dysphoric mood, hallucinations, self-injury, sleep disturbance and suicidal ideas. The patient is not nervous/anxious and is not hyperactive.      Physical Exam:  Physical Exam  Cardiovascular:      Rate and Rhythm: Normal rate.   Pulmonary:      Effort: Pulmonary effort is normal.   Abdominal:      General: Abdomen is flat. Bowel sounds are normal.      Palpations: Abdomen is soft.   Genitourinary:     General: Normal vulva.   Neurological:      Mental Status: She is alert and oriented to person, place, and time.   Urinalysis:  WBCs 0-5, RBCs 0-3 trace, epithelial +1, bacteria trace  Assessment/Plan:     Urge incontinence:  Patient was provided with 4 weeks of Gemtessa samples.  Daughter reports they will wait to start the samples until after she has adjusted to her new Parkinson's medications as to not make multiple medication changes all at once.  In educated daughter and patient the goal is to reduce her episodes of nocturia to ensure adequate rest to reduce her fatigue during the day as well as her risk of falls with her comorbidity of Parkinson's disease.  Daughter and patient both verbalized understanding    Follow up in 6 months or as needed  Problem List Items Addressed This Visit    None  Visit Diagnoses       Urge incontinence    -  Primary

## 2024-06-27 ENCOUNTER — OFFICE VISIT (OUTPATIENT)
Dept: GASTROENTEROLOGY | Facility: CLINIC | Age: 84
End: 2024-06-27
Payer: MEDICARE

## 2024-06-27 ENCOUNTER — TELEPHONE (OUTPATIENT)
Dept: GASTROENTEROLOGY | Facility: CLINIC | Age: 84
End: 2024-06-27

## 2024-06-27 VITALS
SYSTOLIC BLOOD PRESSURE: 134 MMHG | RESPIRATION RATE: 16 BRPM | DIASTOLIC BLOOD PRESSURE: 68 MMHG | WEIGHT: 131.38 LBS | HEART RATE: 82 BPM | HEIGHT: 62 IN | BODY MASS INDEX: 24.18 KG/M2 | OXYGEN SATURATION: 95 %

## 2024-06-27 DIAGNOSIS — Z98.84 H/O GASTRIC BYPASS: ICD-10-CM

## 2024-06-27 DIAGNOSIS — R93.3 ABNORMAL FINDING ON GI TRACT IMAGING: ICD-10-CM

## 2024-06-27 DIAGNOSIS — R11.2 NAUSEA AND VOMITING, UNSPECIFIED VOMITING TYPE: Primary | ICD-10-CM

## 2024-06-27 DIAGNOSIS — R19.7 DIARRHEA, UNSPECIFIED TYPE: ICD-10-CM

## 2024-06-27 DIAGNOSIS — K83.8 INTRAHEPATIC BILE DUCT DILATION: ICD-10-CM

## 2024-06-27 DIAGNOSIS — K83.8 COMMON BILE DUCT DILATATION: ICD-10-CM

## 2024-06-27 PROCEDURE — 99215 OFFICE O/P EST HI 40 MIN: CPT | Mod: S$GLB,,, | Performed by: INTERNAL MEDICINE

## 2024-06-27 RX ORDER — POLYETHYLENE GLYCOL 3350, SODIUM SULFATE ANHYDROUS, SODIUM BICARBONATE, SODIUM CHLORIDE, POTASSIUM CHLORIDE 236; 22.74; 6.74; 5.86; 2.97 G/4L; G/4L; G/4L; G/4L; G/4L
4 POWDER, FOR SOLUTION ORAL ONCE
Qty: 4000 ML | Refills: 0 | Status: SHIPPED | OUTPATIENT
Start: 2024-06-27 | End: 2024-06-27

## 2024-06-27 RX ORDER — ALBUTEROL SULFATE 90 UG/1
2 AEROSOL, METERED RESPIRATORY (INHALATION) EVERY 6 HOURS PRN
COMMUNITY

## 2024-06-27 RX ORDER — PANTOPRAZOLE SODIUM 40 MG/1
40 TABLET, DELAYED RELEASE ORAL DAILY
Qty: 90 TABLET | Refills: 4 | Status: SHIPPED | OUTPATIENT
Start: 2024-06-27

## 2024-06-27 NOTE — TELEPHONE ENCOUNTER
Patient was given instructions and they were reviewed with patient. Order for colonoscopy faxed to central scheduling @ [facility]. Patient was given apt cards. Cardiac clearance letter and office visit notes faxed to Dr. Payne. 6/27/24 LRA

## 2024-06-27 NOTE — PROGRESS NOTES
Clinic Note    Reason for visit:  The primary encounter diagnosis was Nausea and vomiting, unspecified vomiting type. Diagnoses of Diarrhea, unspecified type, Intrahepatic bile duct dilation, Common bile duct dilatation, H/O gastric bypass, and Abnormal finding on GI tract imaging were also pertinent to this visit.    PCP: Leonardo Raman.       HPI:  This is a 83 y.o. female here for follow up. Patient with N/V and diarrhea. On Zenpep 2 capsules with meals. She is unable to tolerate colonoscopy prep.    Was started on Panto 40mg BID and carfate prn at last OV.  Overall she his significantly improved.  Her diarrhea has resolved with the pancreatic enzymes.  She is taking 140 1000 units Zenpep with each meal and snack.  About 4 times a day total.  She will have 1 formed bowel movement in the day typically after breakfast.  She has taking the pantoprazole twice a day.  Her nausea persists.  She takes the Zofran as needed.  Last use was this morning at 4.  She feels that Zofran does help her nausea.  No blood per rectum.  No melena.   She would previously declined colonoscopy because she did not feel she could tolerate the prep.  She was hesitant today knowing that we have to induce diarrhea for the colonoscopy prep but agrees to move forward.    With daughter.  Dr. Raman provided her with the medication she can apply transdermally.  Does not recall the name.    CT AP IV 3/11/2024: stable intrahepatic/extrahepatic biliary ductal dilatation. Diverticulosis. Thickening of sigmoid. Possible ileus. Prominent main portal vein, fatty liver.     UGIS 5/12/2023: Postsurgical changes from a gastrojejunostomy with a widely patent surgical anastomosis. A complete small bowel follow-through could not be performed due to the patient's inability to ingest additional contrast. Esophageal dysmotility with dissipation of the primary peristaltic wave.       3/12/2024 GI stool PCR panel/C diff neg, fecal fat nl, CBC nl,  39H/45/132H, alb 3.2L, Tp 6.3L, CMP onl, TSH/FT4 nl, positive stool lactoferrin.  3/11/2024: CRP 0.60H, 84H/69H/170H, CMP onl, Lipase 102H (13-75), PT/INR/CBC nl    EGD 3/13/2024: Normal esophageal mucosa, esophagogastric junction, and proximal stomach. Surgically altered anatomy consistent with a prior gastroenteric anastomosis that has been closed with a 2nd patent gastroenteric anastomosis that appears to have an enteroenteric anastomosis just beyond the gastric lumen.  All appears well healed with normal overlying mucosa.         [facility] Records:  Previously on Dexilant 60 mg daily, Baclofen 10 mg BID, Vistaril 10 mg TID, Tigan 300 mg prn, Linzess 145 mcg, and GI cocktail+Donnatal prn. Linzess 290 mcg caused diarrhea.   EGD w/Dr. Almanza 4/10/2024: Evidence of gastrojejunostomy found in gastric antrum, in prepyloric region of stomach and in pylorus. Healthy appearing patent anastomosis. Medium amount of food (residue) found in greater curvature of stomach. Gastroparesis from prior vagotomy. 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  EGD/Colonoscopy w/Dr. Almanza 3/10/2010: Previous Tanisha-En-Y gastroplasty with evidence of mild esophagitis. Suture granuloma noted in gastric antrum. Diffuse melanosis coli, liquid stool in entire examined colon, interfering with visualization. IH. Repeat colon in 10 yr. EBx foveolar oxyntic mucosa w/diffuse mod chr infl      Records brought in by patient:  CT abd IV 2015: Fluid-filled thin-walled oversewn duodenal bulb noted. General dilatation of central biliary tree. Enlarged CBD visualized to ampulla region w/o evidence of CBD mass or stone. Postsurgical changes of stomach.      Small Bowel Series 2014: no evidence of small bowel obstructive process. Abnormal appearance to RLQ distal ileal loops w/findings suggestive of tethering or scar.      CT Chest/Abd/Pelvis IV 8/2011: pulmonary nodule and mild patchy infiltrates of lung, Post op changes of  distal stomach. No GB. Chronic changes of pancreas. Biliary prominence noted. Fatty liver. Diverticulosis w/o inflammation.      EGD with dilation of gastric outlet 2006: 10 mm opening in gastric outlet s/p balloon dilation to 20 mm. 2 weeks prior gastric outlet opening was 5-6 mm s/p dilation. Less edema than prior.     Review of Systems   Constitutional:  Negative for fatigue, fever and unexpected weight change.   HENT:  Negative for mouth sores, postnasal drip, sore throat and trouble swallowing.    Eyes:  Negative for pain, discharge and eye dryness.   Respiratory:  Negative for apnea, cough, choking, chest tightness, shortness of breath and wheezing.    Cardiovascular:  Negative for chest pain, palpitations and leg swelling.   Gastrointestinal:  Positive for nausea. Negative for abdominal distention, abdominal pain, anal bleeding, blood in stool, change in bowel habit, constipation, diarrhea, rectal pain, vomiting, reflux and fecal incontinence.   Genitourinary:  Positive for bladder incontinence. Negative for dysuria and hematuria.   Musculoskeletal:  Negative for arthralgias, back pain and joint swelling.   Integumentary:  Negative for color change and rash.   Allergic/Immunologic: Negative for environmental allergies and food allergies.   Neurological:  Negative for seizures and headaches.   Hematological:  Negative for adenopathy. Does not bruise/bleed easily.        Past Medical History:   Diagnosis Date    Allergies     Aortic stenosis     CHF (congestive heart failure)     Gastroparesis     GERD (gastroesophageal reflux disease)     Heart disease     History of pulmonary embolism     History of small bowel obstruction     HTN (hypertension)     Hypoxia     Mixed hyperlipidemia     Parkinson's disease     Pneumonia, unspecified organism     Respiratory failure      Past Surgical History:   Procedure Laterality Date    ABDOMINAL ADHESION SURGERY      APPENDECTOMY      BLADDER SUSPENSION       CHOLECYSTECTOMY      EXPLORATORY LAPAROTOMY      closure of small bowel perforation    GASTRIC BYPASS      HYSTERECTOMY      REPAIR, LACERATION, ABDOMEN      SHOULDER OPEN ROTATOR CUFF REPAIR      VAGOTOMY  2006     Family History   Problem Relation Name Age of Onset    Hypertension Father      Lung cancer Brother      Colon cancer Brother      Lung cancer Brother      Breast cancer Sister       Social History     Tobacco Use    Smoking status: Never     Passive exposure: Never    Smokeless tobacco: Never   Substance Use Topics    Alcohol use: Never    Drug use: Never     Review of patient's allergies indicates:   Allergen Reactions    Codeine Other (See Comments)     Hyper        Medication List with Changes/Refills   New Medications    POLYETHYLENE GLYCOL (GOLYTELY) 236-22.74-6.74 -5.86 GRAM SUSPENSION    Take 4,000 mLs (4 L total) by mouth once. for 1 dose   Current Medications    ALBUTEROL (PROVENTIL/VENTOLIN HFA) 90 MCG/ACTUATION INHALER    Inhale 2 puffs into the lungs every 6 (six) hours as needed for Shortness of Breath or Wheezing.    AMLODIPINE (NORVASC) 2.5 MG TABLET    Take 2.5 mg by mouth once daily.    APIXABAN (ELIQUIS) 2.5 MG TAB    Take by mouth 2 (two) times daily.    ASPIRIN 81 MG CHEW    Take 81 mg by mouth once daily.    ATORVASTATIN (LIPITOR) 40 MG TABLET    Take 40 mg by mouth once daily.    BETA-CAROTENE,A,-VITS C,E/MINS (OCUVITE ORAL)    Take by mouth.    CARBAMAZEPINE (TEGRETOL XR) 100 MG 12 HR TABLET    Take 100 mg by mouth 2 (two) times daily.    FUROSEMIDE (LASIX) 20 MG TABLET    Take 20 mg by mouth 2 (two) times daily.    LEVOTHYROXINE (SYNTHROID) 25 MCG TABLET    Take 25 mcg by mouth before breakfast.    LIPASE-PROTEASE-AMYLASE (ZENPEP) 40,000-126,000- 168,000 UNIT CPDR    Take 1 capsule with each meal and snack.    MELATONIN 3 MG CAP    Take 3 mg by mouth every evening.    MIRABEGRON (MYRBETRIQ) 25 MG TB24 ER TABLET    Take 25 mg by mouth once daily.    MONTELUKAST (SINGULAIR) 10 MG  "TABLET    Take 10 mg by mouth every evening.    ONDANSETRON (ZOFRAN) 8 MG TABLET    Take 8 mg by mouth every 8 (eight) hours as needed for Nausea.    SERTRALINE (ZOLOFT) 25 MG TABLET    Take 50 mg by mouth once daily.    VALSARTAN (DIOVAN) 160 MG TABLET    Take 80 mg by mouth once daily.    VITAMIN D (VITAMIN D3) 1000 UNITS TAB    Take 5,000 Units by mouth once daily.   Changed and/or Refilled Medications    Modified Medication Previous Medication    PANTOPRAZOLE (PROTONIX) 40 MG TABLET pantoprazole (PROTONIX) 40 MG tablet       Take 1 tablet (40 mg total) by mouth once daily.    Take 1 tablet (40 mg total) by mouth 2 (two) times daily.   Discontinued Medications    LOPERAMIDE (IMODIUM) 2 MG CAPSULE    Take 2 mg by mouth 4 (four) times daily as needed for Diarrhea.         Vital Signs:  /68 (BP Location: Left arm, Patient Position: Sitting, BP Method: Medium (Automatic))   Pulse 82   Resp 16   Ht 5' 2" (1.575 m)   Wt 59.6 kg (131 lb 6.4 oz)   SpO2 95%   BMI 24.03 kg/m²         Physical Exam  Vitals reviewed.   Constitutional:       General: She is awake. She is not in acute distress.     Appearance: Normal appearance. She is well-developed. She is not ill-appearing, toxic-appearing or diaphoretic.      Comments: Single-prong cane   HENT:      Head: Normocephalic and atraumatic.      Nose: Nose normal.      Mouth/Throat:      Mouth: Mucous membranes are moist.      Pharynx: Oropharynx is clear. No oropharyngeal exudate or posterior oropharyngeal erythema.   Eyes:      General: Lids are normal. Gaze aligned appropriately. No scleral icterus.        Right eye: No discharge.         Left eye: No discharge.      Conjunctiva/sclera: Conjunctivae normal.   Neck:      Trachea: Trachea normal.      Comments: Mild kyphosis  Cardiovascular:      Rate and Rhythm: Normal rate and regular rhythm.      Pulses:           Radial pulses are 2+ on the right side and 2+ on the left side.   Pulmonary:      Effort: Pulmonary " effort is normal. No respiratory distress.      Breath sounds: No stridor. No wheezing.   Chest:      Chest wall: No tenderness.   Abdominal:      General: Bowel sounds are normal. There is distension.      Palpations: Abdomen is soft. There is no fluid wave, hepatomegaly or mass.      Tenderness: There is no abdominal tenderness. There is no guarding or rebound.      Comments: Mildly distended with air   Musculoskeletal:         General: No tenderness or deformity.      Cervical back: Full passive range of motion without pain and neck supple. No tenderness.      Right lower leg: No edema.      Left lower leg: No edema.   Lymphadenopathy:      Cervical: No cervical adenopathy.   Skin:     General: Skin is warm and dry.      Capillary Refill: Capillary refill takes less than 2 seconds.      Coloration: Skin is not cyanotic, jaundiced or pale.   Neurological:      General: No focal deficit present.      Mental Status: She is alert and oriented to person, place, and time.      Motor: No tremor.   Psychiatric:         Attention and Perception: Attention normal.         Mood and Affect: Mood and affect normal.         Speech: Speech normal.         Behavior: Behavior normal. Behavior is cooperative.            All of the data above and below has been reviewed by myself and any further interpretations will be reflected in the assessment and plan.   The data includes review of external notes, and independent interpretation of lab results, procedures, x-rays, and imaging reports.      Assessment:  Nausea and vomiting, unspecified vomiting type  Comments:  nausea persists, improves with Zofran, no vomiting  Orders:  -     pantoprazole (PROTONIX) 40 MG tablet; Take 1 tablet (40 mg total) by mouth once daily.  Dispense: 90 tablet; Refill: 4    Diarrhea, unspecified type  Comments:  resolved with pancreatic enzymes  Orders:  -     Ambulatory Referral to External Surgery    Intrahepatic bile duct dilation    Common bile duct  dilatation    H/O gastric bypass  -     pantoprazole (PROTONIX) 40 MG tablet; Take 1 tablet (40 mg total) by mouth once daily.  Dispense: 90 tablet; Refill: 4    Abnormal finding on GI tract imaging  Comments:  3/2024 sigmoid thickening on CT  Orders:  -     Ambulatory Referral to External Surgery  -     polyethylene glycol (GOLYTELY) 236-22.74-6.74 -5.86 gram suspension; Take 4,000 mLs (4 L total) by mouth once. for 1 dose  Dispense: 4000 mL; Refill: 0    We will schedule colonoscopy.  Send clearance letter and requests last office visit notes from Dr. Janae loza to update our chart.  Non urgent.     Recommendations:    Schedule colonoscopy.  Do not take Eliquis 1 day prior to colonoscopy.  Continue Zenpep as is.  Notify me sooner if any new or worsening GI symptoms.    Decrease pantoprazole 40 mg from twice daily to once daily.  Okay to increase back to twice daily if needed but notify me so we can update your prescription.    Risks, benefits, and alternatives of medical management, any associated procedures, and/or treatment discussed with the patient. Patient given opportunity to ask questions and voices understanding. Patient has elected to proceed with the recommended care modalities as discussed.    Instructed patient to notify my office if they have not been contacted within two weeks after any procedures, submitting any samples (biopsies, blood, stool, urine, etc.) or after any imaging (X-ray, CT, MRI, etc.).     Follow up in about 1 year (around 6/27/2025). 6m w/NP.    Order summary:  Orders Placed This Encounter   Procedures    Ambulatory Referral to External Surgery      This document may have been created using a voice recognition transcribing system. Incorrect words or phrases may have been missed during proofreading. Please interpret accordingly or contact me for clarification.     Domitila Paz MD

## 2024-06-27 NOTE — LETTER
June 27, 2024        Leonardo Raman MD  [address]  [place] [address]             [facility]  [address] [address]  Phone: [phone]  Fax: [phone]   Patient: Rochelle Mckeon   MR Number: [id number]   YOB: 1940   Date of Visit: 6/27/2024       Dear Dr. Raman:    Thank you for referring Rochelle Mckeon to me for evaluation. Attached you will find relevant portions of my assessment and plan of care.    If you have questions, please do not hesitate to call me. I look forward to following Rochelle Mckeon along with you.    Sincerely,      Domitila Paz MD            CC  No Recipients    Enclosure

## 2024-06-27 NOTE — PATIENT INSTRUCTIONS
Schedule colonoscopy.  Do not take Eliquis 1 day prior to colonoscopy.  Continue Zenpep as is.  Notify me sooner if any new or worsening GI symptoms.    Decrease pantoprazole 40 mg from twice daily to once daily.  Okay to increase back to twice daily if needed but notify me so we can update your prescription.    Please notify my office if you have not been contacted within two weeks after any procedures, submitting any samples (biopsies, blood, stool, urine, etc.) or after any imaging (X-ray, CT, MRI, etc.).

## 2024-06-27 NOTE — LETTER
Cardiac Clearance Form    PLEASE PROVIDE ALL INFORMATION      Date : 2024    Dr. Payne,    Please provide us with WRITTEN Cardiac Clearance on Mrs. Rochelle Mckeon.  : 1940.        Please send any test results such as : EKG, Holter Monitor, Echocardiogram and or Stress tests, and last office visit notes.      Patient will be scheduled for a Colonoscopy under General/MAC anesthesia scheduled for .    Dr. Paz is requesting to hold Eliquis for 1 day prior to procedure.       PLEASE FAX THIS CLEARANCE WITH TEST RESULTS -[phone].   Thank you for your help in this matter.    Sincerely,  Mauri Galicia  Phone- [phone]  Fax- [phone]

## 2024-08-12 ENCOUNTER — TELEPHONE (OUTPATIENT)
Dept: GASTROENTEROLOGY | Facility: CLINIC | Age: 84
End: 2024-08-12
Payer: MEDICARE

## 2024-08-12 VITALS — HEIGHT: 62 IN | BODY MASS INDEX: 24.11 KG/M2 | WEIGHT: 131 LBS

## 2024-08-12 DIAGNOSIS — R93.3 ABNORMAL FINDING ON GI TRACT IMAGING: ICD-10-CM

## 2024-08-12 DIAGNOSIS — R19.7 DIARRHEA, UNSPECIFIED TYPE: Primary | ICD-10-CM

## 2024-08-12 NOTE — TELEPHONE ENCOUNTER
Called and left a detailed message that I was calling as a courtesy regarding up coming Colon with NBP on 8/19/24, Monday and wanted to verify that she has her paper prep instructions and meds.  I also mentioned that [facility] will call the day before (FRI) with the arrival time, GI Lab is located on the third floor, and to pre-register any time this week. ramirez

## 2024-08-12 NOTE — TELEPHONE ENCOUNTER
"[facility]  [address] [place] [address]  Phone: [phone]  Fax: [phone]    History & Physical         Provider: Dr. Doimtila Paz    Patient Name: Rochelle TORRES (age):1940  83 y.o.           Gender: female   Phone: [phone]     Referring Physician: Leonardo Raman     Vital Signs:   Height - 5' 2"  Weight - 131 lb   BMI -  23.96    Plan: Colonoscopy @ [facility]    No diagnosis found.        History:      Past Medical History:   Diagnosis Date    Allergies     Aortic stenosis     2024 moderate AS    CHF (congestive heart failure)     2024 ER 63%    GERD (gastroesophageal reflux disease)     Heart disease     History of gastroparesis     History of pulmonary embolism     History of small bowel obstruction     HTN (hypertension)     Hypoxia     Mixed hyperlipidemia     Parkinson's disease     Pneumonia, unspecified organism     Respiratory failure       Past Surgical History:   Procedure Laterality Date    ABDOMINAL ADHESION SURGERY      APPENDECTOMY      BLADDER SUSPENSION      CHOLECYSTECTOMY      EXPLORATORY LAPAROTOMY      closure of small bowel perforation    GASTRIC BYPASS      HYSTERECTOMY      REPAIR, LACERATION, ABDOMEN      SHOULDER OPEN ROTATOR CUFF REPAIR      VAGOTOMY        Medication List with Changes/Refills   Current Medications    ALBUTEROL (PROVENTIL/VENTOLIN HFA) 90 MCG/ACTUATION INHALER    Inhale 2 puffs into the lungs every 6 (six) hours as needed for Shortness of Breath or Wheezing.    AMLODIPINE (NORVASC) 2.5 MG TABLET    Take 2.5 mg by mouth once daily.    APIXABAN (ELIQUIS) 2.5 MG TAB    Take by mouth 2 (two) times daily.    ASPIRIN 81 MG CHEW    Take 81 mg by mouth once daily.    ATORVASTATIN (LIPITOR) 40 MG TABLET    Take 40 mg by mouth once daily.    BETA-CAROTENE,A,-VITS C,E/MINS (OCUVITE ORAL)    Take by mouth.    CARBAMAZEPINE (TEGRETOL XR) 100 MG " 12 HR TABLET    Take 100 mg by mouth 2 (two) times daily.    FUROSEMIDE (LASIX) 20 MG TABLET    Take 20 mg by mouth 2 (two) times daily.    LEVOTHYROXINE (SYNTHROID) 25 MCG TABLET    Take 25 mcg by mouth before breakfast.    LIPASE-PROTEASE-AMYLASE (ZENPEP) 40,000-126,000- 168,000 UNIT CPDR    Take 1 capsule with each meal and snack.    MELATONIN 3 MG CAP    Take 3 mg by mouth every evening.    MIRABEGRON (MYRBETRIQ) 25 MG TB24 ER TABLET    Take 25 mg by mouth once daily.    MONTELUKAST (SINGULAIR) 10 MG TABLET    Take 10 mg by mouth every evening.    ONDANSETRON (ZOFRAN) 8 MG TABLET    Take 8 mg by mouth every 8 (eight) hours as needed for Nausea.    PANTOPRAZOLE (PROTONIX) 40 MG TABLET    Take 1 tablet (40 mg total) by mouth once daily.    SERTRALINE (ZOLOFT) 25 MG TABLET    Take 50 mg by mouth once daily.    VALSARTAN (DIOVAN) 160 MG TABLET    Take 80 mg by mouth once daily.    VITAMIN D (VITAMIN D3) 1000 UNITS TAB    Take 5,000 Units by mouth once daily.      Review of patient's allergies indicates:   Allergen Reactions    Codeine Other (See Comments)     Hyper      Family History   Problem Relation Name Age of Onset    Hypertension Father      Lung cancer Brother      Colon cancer Brother      Lung cancer Brother      Breast cancer Sister        Social History     Tobacco Use    Smoking status: Never     Passive exposure: Never    Smokeless tobacco: Never   Substance Use Topics    Alcohol use: Never    Drug use: Never        Physical Examination:     General Appearance:___________________________  HEENT:  _____________________________________  Abdomen:____________________________________  Heart:________________________________________  Lungs:_______________________________________  Extremities:___________________________________  Skin:_________________________________________  Endocrine:____________________________________  Genitourinary:_________________________________  Neurological:__________________________________      Patient has been evaluated immediately prior to sedation and is medically cleared for endoscopy with IVCS as an ASA class: ______      Physician Signature: _________________________       Date: ________  Time: ________

## 2024-08-13 ENCOUNTER — TELEPHONE (OUTPATIENT)
Dept: GASTROENTEROLOGY | Facility: CLINIC | Age: 84
End: 2024-08-13
Payer: MEDICARE

## 2024-08-13 NOTE — TELEPHONE ENCOUNTER
Called and spoke with daughter she stated that they will be going register on tomorrow for procedure on Monday, they were out of town so it will be done .  lata

## 2024-08-13 NOTE — TELEPHONE ENCOUNTER
----- Message from Terese Ruelas sent at 8/13/2024 11:47 AM CDT -----  Contact: hermann - daughter  Patient is requesting a call back regarding letting office know pt will register tomorrow because they have been out of town. Please call back at [phone]

## 2024-08-19 ENCOUNTER — OUTSIDE PLACE OF SERVICE (OUTPATIENT)
Dept: GASTROENTEROLOGY | Facility: CLINIC | Age: 84
End: 2024-08-19

## 2024-09-02 ENCOUNTER — TELEPHONE (OUTPATIENT)
Dept: GASTROENTEROLOGY | Facility: CLINIC | Age: 84
End: 2024-09-02
Payer: MEDICARE

## 2024-09-02 DIAGNOSIS — R19.7 DIARRHEA, UNSPECIFIED TYPE: ICD-10-CM

## 2024-09-02 DIAGNOSIS — R63.4 WEIGHT LOSS: ICD-10-CM

## 2024-09-02 DIAGNOSIS — R93.3 ABNORMAL FINDING ON GI TRACT IMAGING: Primary | ICD-10-CM

## 2024-09-02 NOTE — TELEPHONE ENCOUNTER
1 TA, 1 hyp, repeat colonoscopy with extended prep.    Notify patient that her colon polyps were benign. She had a significant amount of stool still in her colon at the time of the colonoscopy. Schedule repeat colonoscopy with two days of CLD, MiraLAX TID the week prior and regular colonoscopy prep. No Eliquis the day prior to colonoscopy. Order signed.  NBP

## 2024-09-12 ENCOUNTER — DOCUMENTATION ONLY (OUTPATIENT)
Dept: GASTROENTEROLOGY | Facility: CLINIC | Age: 84
End: 2024-09-12
Payer: MEDICARE

## 2024-09-23 NOTE — TELEPHONE ENCOUNTER
Spoke with the patient, gave results of Colonoscopy and informed patient that she had a significant amount of stool still in her colon at the time of the colonoscopy and that Dr. Paz wanted to schedule her for a repeat colonoscopy with two days of CLD, MiraLAX TID the week prior and regular colonoscopy prep. No Eliquis the day prior to colonoscopy.     Patient agreed.    Colonoscopy was scheduled for Wednesday 2/12/2025. Patient was reminded to pre-register with the hospital 30 days in advanced.    Patient verbalized understanding of this information.    Order printed and faxed to [facility]. No PA required. -BRIAN,LPN

## 2024-12-16 ENCOUNTER — OFFICE VISIT (OUTPATIENT)
Dept: GASTROENTEROLOGY | Facility: CLINIC | Age: 84
End: 2024-12-16
Payer: MEDICARE

## 2024-12-16 VITALS
DIASTOLIC BLOOD PRESSURE: 74 MMHG | HEART RATE: 74 BPM | BODY MASS INDEX: 24.99 KG/M2 | HEIGHT: 62 IN | SYSTOLIC BLOOD PRESSURE: 128 MMHG | WEIGHT: 135.81 LBS | OXYGEN SATURATION: 93 % | RESPIRATION RATE: 16 BRPM

## 2024-12-16 DIAGNOSIS — R11.2 NAUSEA AND VOMITING, UNSPECIFIED VOMITING TYPE: ICD-10-CM

## 2024-12-16 DIAGNOSIS — K83.8 INTRAHEPATIC BILE DUCT DILATION: ICD-10-CM

## 2024-12-16 DIAGNOSIS — Z80.0 FAMILY HISTORY OF COLON CANCER: ICD-10-CM

## 2024-12-16 DIAGNOSIS — R63.4 WEIGHT LOSS: ICD-10-CM

## 2024-12-16 DIAGNOSIS — R19.7 DIARRHEA, UNSPECIFIED TYPE: Primary | ICD-10-CM

## 2024-12-16 DIAGNOSIS — K83.8 COMMON BILE DUCT DILATATION: ICD-10-CM

## 2024-12-16 DIAGNOSIS — Z98.84 H/O GASTRIC BYPASS: ICD-10-CM

## 2024-12-16 DIAGNOSIS — K31.84 GASTROPARESIS: ICD-10-CM

## 2024-12-16 DIAGNOSIS — Z86.0100 HISTORY OF COLON POLYPS: ICD-10-CM

## 2024-12-16 PROCEDURE — 99214 OFFICE O/P EST MOD 30 MIN: CPT | Mod: S$PBB,,,

## 2024-12-16 RX ORDER — CEFDINIR 300 MG/1
CAPSULE ORAL
COMMUNITY
Start: 2024-12-12

## 2024-12-16 RX ORDER — BENZONATATE 100 MG/1
CAPSULE ORAL
COMMUNITY
Start: 2024-08-23

## 2024-12-16 RX ORDER — SULFAMETHOXAZOLE AND TRIMETHOPRIM 800; 160 MG/1; MG/1
TABLET ORAL
COMMUNITY
Start: 2024-07-29

## 2024-12-16 RX ORDER — CARBIDOPA, LEVODOPA AND ENTACAPONE 25; 200; 100 MG/1; MG/1; MG/1
1 TABLET, FILM COATED ORAL 3 TIMES DAILY
COMMUNITY
Start: 2024-11-27

## 2024-12-16 RX ORDER — PREGABALIN 75 MG/1
CAPSULE ORAL
COMMUNITY
Start: 2024-12-05

## 2024-12-16 NOTE — PATIENT INSTRUCTIONS
Continue Zenpep as is.  Proceed with colonoscopy with Dr. Paz on 2/12/2025. You will need two days (rather than one day) of clear liquid diet and MiraLAX 1 capful/dose, 3 times daily the week prior to colonoscopy with goal of very loose stools. Then regular prep instructions the day prior to colonoscopy. Do not take Eliquis the day before procedure.       Please notify my office if you have not been contacted within two weeks after any procedures, submitting any samples (biopsies, blood, stool, urine, etc.) or after any imaging (X-ray, CT, MRI, etc.).

## 2024-12-16 NOTE — PROGRESS NOTES
Clinic Note    Reason for visit:  The primary encounter diagnosis was Diarrhea, unspecified type. Diagnoses of Weight loss, Nausea and vomiting, unspecified vomiting type, Intrahepatic bile duct dilation, Common bile duct dilatation, H/O gastric bypass, Gastroparesis, History of colon polyps, and Family history of colon cancer were also pertinent to this visit.    PCP: Leonardo Raman       HPI:  This is a 84 y.o. female here for follow up. Patients daughter present. Patient doing well on Zenpep 2 capsules with meals. Scheduled for repeat colonoscopy 2/12/2025. Was started on Panto 40mg once daily and carfate prn at last OV. Overall she his significantly improved.  Her diarrhea has resolved with the pancreatic enzymes.  She is taking 140 1000 units Zenpep with each meal and snack.  About 4 times a day total.  She will have 1 formed bowel movement in the day typically after breakfast.   She has trigeminal neuralgia and had to start back on Lyrica and nausea has not been as bad since.     Colonoscopy 8/19/2024: Pancolonic diverticulosis. Fecal contamination. 1 TA, 1 hyp, repeat colonoscopy with extended prep. two days of CLD, MiraLAX TID the week prior and regular colonoscopy prep. No Eliquis the day prior to colonoscopy.     CT AP IV 3/11/2024: stable intrahepatic/extrahepatic biliary ductal dilatation. Diverticulosis. Thickening of sigmoid. Possible ileus. Prominent main portal vein, fatty liver.     UGIS 5/12/2023: Postsurgical changes from a gastrojejunostomy with a widely patent surgical anastomosis. A complete small bowel follow-through could not be performed due to the patient's inability to ingest additional contrast. Esophageal dysmotility with dissipation of the primary peristaltic wave.       3/12/2024 GI stool PCR panel/C diff neg, fecal fat nl, CBC nl, 39H/45/132H, alb 3.2L, Tp 6.3L, CMP onl, TSH/FT4 nl, positive stool lactoferrin.  3/11/2024: CRP 0.60H, 84H/69H/170H, CMP onl, Lipase 102H (13-75),  PT/INR/CBC nl     EGD 3/13/2024: Normal esophageal mucosa, esophagogastric junction, and proximal stomach. Surgically altered anatomy consistent with a prior gastroenteric anastomosis that has been closed with a 2nd patent gastroenteric anastomosis that appears to have an enteroenteric anastomosis just beyond the gastric lumen.  All appears well healed with normal overlying mucosa.        [facility]:  Previously on Dexilant 60 mg daily, Baclofen 10 mg BID, Vistaril 10 mg TID, Tigan 300 mg prn, Linzess 145 mcg, and GI cocktail+Donnatal prn. Linzess 290 mcg caused diarrhea.   EGD w/Dr. Almanza 4/10/2024: Evidence of gastrojejunostomy found in gastric antrum, in prepyloric region of stomach and in pylorus. Healthy appearing patent anastomosis. Medium amount of food (residue) found in greater curvature of stomach. Gastroparesis from prior vagotomy. 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  EGD/Colonoscopy w/Dr. Almanza 3/10/2010: Previous Tanisha-En-Y gastroplasty with evidence of mild esophagitis. Suture granuloma noted in gastric antrum. Diffuse melanosis coli, liquid stool in entire examined colon, interfering with visualization. IH. Repeat colon in 10 yr. EBx foveolar oxyntic mucosa w/diffuse mod chr infl        Records brought in by patient:  CT abd IV 2015: Fluid-filled thin-walled oversewn duodenal bulb noted. General dilatation of central biliary tree. Enlarged CBD visualized to ampulla region w/o evidence of CBD mass or stone. Postsurgical changes of stomach.      Small Bowel Series 2014: no evidence of small bowel obstructive process. Abnormal appearance to RLQ distal ileal loops w/findings suggestive of tethering or scar.      CT Chest/Abd/Pelvis IV 8/2011: pulmonary nodule and mild patchy infiltrates of lung, Post op changes of distal stomach. No GB. Chronic changes of pancreas. Biliary prominence noted. Fatty liver. Diverticulosis w/o inflammation.      EGD with dilation  of gastric outlet 2006: 10 mm opening in gastric outlet s/p balloon dilation to 20 mm. 2 weeks prior gastric outlet opening was 5-6 mm s/p dilation. Less edema than prior.    Review of Systems   Constitutional:  Negative for fatigue, fever and unexpected weight change.   HENT:  Negative for mouth sores, postnasal drip, sore throat and trouble swallowing.    Eyes:  Negative for pain, discharge and eye dryness.   Respiratory:  Positive for cough, shortness of breath and wheezing. Negative for apnea, choking and chest tightness.    Cardiovascular:  Positive for leg swelling. Negative for chest pain and palpitations.   Gastrointestinal:  Positive for nausea. Negative for abdominal distention, abdominal pain, anal bleeding, blood in stool, change in bowel habit, constipation, diarrhea, rectal pain, vomiting, reflux and fecal incontinence.   Genitourinary:  Negative for bladder incontinence, dysuria and hematuria.   Musculoskeletal:  Negative for arthralgias, back pain and joint swelling.   Integumentary:  Negative for color change and rash.   Allergic/Immunologic: Positive for environmental allergies. Negative for food allergies.   Neurological:  Negative for seizures and headaches.   Hematological:  Negative for adenopathy. Bruises/bleeds easily.        Past Medical History:   Diagnosis Date    Allergies     Aortic stenosis     4/2024 moderate AS    CHF (congestive heart failure)     4/2024 ER 63%    GERD (gastroesophageal reflux disease)     Heart disease     History of gastroparesis     History of pulmonary embolism     History of small bowel obstruction     HTN (hypertension)     Hypoxia     Mixed hyperlipidemia     Parkinson's disease     Pneumonia, unspecified organism     Respiratory failure      Past Surgical History:   Procedure Laterality Date    ABDOMINAL ADHESION SURGERY      APPENDECTOMY      BLADDER SUSPENSION      CHOLECYSTECTOMY      EXPLORATORY LAPAROTOMY      closure of small bowel perforation     GASTRIC BYPASS      HYSTERECTOMY      REPAIR, LACERATION, ABDOMEN      SHOULDER OPEN ROTATOR CUFF REPAIR      VAGOTOMY  2006     Family History   Problem Relation Name Age of Onset    Hypertension Father      Lung cancer Brother      Colon cancer Brother      Lung cancer Brother      Breast cancer Sister       Social History     Tobacco Use    Smoking status: Never     Passive exposure: Never    Smokeless tobacco: Never   Substance Use Topics    Alcohol use: Never    Drug use: Never     Review of patient's allergies indicates:   Allergen Reactions    Codeine Other (See Comments)     Hyper      Medication List with Changes/Refills   Current Medications    ALBUTEROL (PROVENTIL/VENTOLIN HFA) 90 MCG/ACTUATION INHALER    Inhale 2 puffs into the lungs every 6 (six) hours as needed for Shortness of Breath or Wheezing.    AMLODIPINE (NORVASC) 2.5 MG TABLET    Take 2.5 mg by mouth once daily.    APIXABAN (ELIQUIS) 2.5 MG TAB    Take by mouth 2 (two) times daily.    ASPIRIN 81 MG CHEW    Take 81 mg by mouth once daily.    ATORVASTATIN (LIPITOR) 40 MG TABLET    Take 40 mg by mouth once daily.    BENZONATATE (TESSALON) 100 MG CAPSULE    TAKE ONE CAPSULE BY MOUTH THREE TIMES DAILY FOR COUGH    BETA-CAROTENE,A,-VITS C,E/MINS (OCUVITE ORAL)    Take by mouth.    CARBAMAZEPINE (TEGRETOL XR) 100 MG 12 HR TABLET    Take 100 mg by mouth 2 (two) times daily.    CARBIDOPA-LEVODOPA-ENTACAPONE -200 MG (STALEVO) -200 MG TAB    Take 1 tablet by mouth 3 (three) times daily.    CEFDINIR (OMNICEF) 300 MG CAPSULE    TAKE ONE CAPSULE BY MOUTH TWICE A DAY THE FIRST FIVE DAYS OF MARCH, JUNE, SEPT AND DEC    FUROSEMIDE (LASIX) 20 MG TABLET    Take 20 mg by mouth 2 (two) times daily.    LEVOTHYROXINE (SYNTHROID) 25 MCG TABLET    Take 25 mcg by mouth before breakfast.    LIPASE-PROTEASE-AMYLASE (ZENPEP) 40,000-126,000- 168,000 UNIT CPDR    Take 1 capsule with each meal and snack.    MELATONIN 3 MG CAP    Take 3 mg by mouth every evening.  "   MIRABEGRON (MYRBETRIQ) 25 MG TB24 ER TABLET    Take 25 mg by mouth once daily.    MONTELUKAST (SINGULAIR) 10 MG TABLET    Take 10 mg by mouth every evening.    ONDANSETRON (ZOFRAN) 8 MG TABLET    Take 8 mg by mouth every 8 (eight) hours as needed for Nausea.    PANTOPRAZOLE (PROTONIX) 40 MG TABLET    Take 1 tablet (40 mg total) by mouth once daily.    PREGABALIN (LYRICA) 75 MG CAPSULE    TAKE 1 CAPSULE BY MOUTH TWICE A DAY TAKE 1 BY MOUTH AT MIDDAY AND 1 BY MOUTH IN THE EVENING    SERTRALINE (ZOLOFT) 25 MG TABLET    Take 50 mg by mouth once daily.    SULFAMETHOXAZOLE-TRIMETHOPRIM 800-160MG (BACTRIM DS) 800-160 MG TAB    TAKE ONE TABLET BY MOUTH TWICE DAILY FOR THE FIRST FIVE DAYS OF FEB, MAY, AUGUST AND NOV    VALSARTAN (DIOVAN) 160 MG TABLET    Take 80 mg by mouth once daily.    VITAMIN D (VITAMIN D3) 1000 UNITS TAB    Take 5,000 Units by mouth once daily.         Vital Signs:  /74 (BP Location: Left arm, Patient Position: Sitting)   Pulse 74   Resp 16   Ht 5' 2" (1.575 m)   Wt 61.6 kg (135 lb 12.8 oz)   SpO2 (!) 93%   BMI 24.84 kg/m²        Physical Exam  Vitals reviewed.   Constitutional:       General: She is awake. She is not in acute distress.     Appearance: Normal appearance. She is well-developed. She is not ill-appearing, toxic-appearing or diaphoretic.   HENT:      Head: Normocephalic and atraumatic.      Nose: Nose normal.      Mouth/Throat:      Mouth: Mucous membranes are moist.      Pharynx: Oropharynx is clear. No oropharyngeal exudate or posterior oropharyngeal erythema.   Eyes:      General: Lids are normal. Gaze aligned appropriately. No scleral icterus.        Right eye: No discharge.         Left eye: No discharge.      Conjunctiva/sclera: Conjunctivae normal.   Neck:      Trachea: Trachea normal.      Comments: Mild kyphosis  Cardiovascular:      Rate and Rhythm: Normal rate and regular rhythm.      Pulses:           Radial pulses are 2+ on the right side and 2+ on the left side. "   Pulmonary:      Effort: Pulmonary effort is normal. No respiratory distress.      Breath sounds: No stridor. No wheezing.   Chest:      Chest wall: No tenderness.   Abdominal:      General: Bowel sounds are normal. There is no distension.      Palpations: Abdomen is soft. There is no fluid wave, hepatomegaly or mass.      Tenderness: There is no abdominal tenderness. There is no guarding or rebound.      Comments: Mildly distended with air   Musculoskeletal:         General: No tenderness or deformity.      Cervical back: Full passive range of motion without pain and neck supple. No tenderness.      Right lower leg: No edema.      Left lower leg: No edema.   Lymphadenopathy:      Cervical: No cervical adenopathy.   Skin:     General: Skin is warm and dry.      Capillary Refill: Capillary refill takes less than 2 seconds.      Coloration: Skin is not cyanotic, jaundiced or pale.   Neurological:      General: No focal deficit present.      Mental Status: She is alert and oriented to person, place, and time.      Motor: No tremor.   Psychiatric:         Attention and Perception: Attention normal.         Mood and Affect: Mood and affect normal.         Speech: Speech normal.         Behavior: Behavior normal. Behavior is cooperative.            All of the data above and below has been reviewed by myself and any further interpretations will be reflected in the assessment and plan.   The data includes review of external notes, and independent interpretation of lab results, procedures, x-rays, and imaging reports.      Assessment:  Diarrhea, unspecified type  Comments:  resolved with pancreatic enzymes    Weight loss    Nausea and vomiting, unspecified vomiting type  Comments:  nausea persists, improves with Zofran, no vomiting    Intrahepatic bile duct dilation  Comments:  stable on CT 3/2024    Common bile duct dilatation    H/O gastric bypass    Gastroparesis    History of colon polyps    Family history of colon  cancer    Poor prep on last colonoscopy. Scheduled for repeat with aggressive prep 2/2025. Will make sure no reason she can't have Sutab. If Sutab okay, she will come back and  a sample box.   Doing well on Zenpep. Nausea not as severe. Gaining weight.      Recommendations:  Continue Zenpep as is.  Proceed with colonoscopy with Dr. Paz on 2/12/2025. You will need two days (rather than one day) of clear liquid diet and MiraLAX 1 capful/dose, 3 times daily the week prior to colonoscopy with goal of very loose stools. Then regular prep instructions the day prior to colonoscopy. Do not take Eliquis the day before procedure.     Risks, benefits, and alternatives of medical management, any associated procedures, and/or treatment discussed with the patient. Patient given opportunity to ask questions and voices understanding. Patient has elected to proceed with the recommended care modalities as discussed.    Keep follow up with Dr. Paz 6/24/2025.     Order summary:  No orders of the defined types were placed in this encounter.       Instructed patient to notify my office if they have not been contacted within two weeks after any procedures, submitting any samples (biopsies, blood, stool, urine, etc.) or after any imaging (X-ray, CT, MRI, etc.).      Olivia Hernandez NP    This document may have been created using a voice recognition transcribing system. Incorrect words or phrases may have been missed during proofreading. Please interpret accordingly or contact me for clarification.

## 2024-12-16 NOTE — LETTER
December 16, 2024        Leonardo Raman MD  [address]  [place] [address]             [facility]  [address] [place] [address]  Phone: [phone]  Fax: [phone]   Patient: Rochelle Mckeon   MR Number: [id number]   YOB: 1940   Date of Visit: 12/16/2024       Dear Dr. Raman:    Thank you for referring Rochelle Mckeon to me for evaluation. Attached you will find relevant portions of my assessment and plan of care.    If you have questions, please do not hesitate to call me. I look forward to following Rochelle Mckeon along with you.    Sincerely,      Olivia Hernandez, NP            CC  No Recipients    Enclosure

## 2025-01-02 ENCOUNTER — TELEPHONE (OUTPATIENT)
Dept: GASTROENTEROLOGY | Facility: CLINIC | Age: 85
End: 2025-01-02
Payer: MEDICARE

## 2025-01-02 RX ORDER — POLYETHYLENE GLYCOL 3350, SODIUM SULFATE ANHYDROUS, SODIUM BICARBONATE, SODIUM CHLORIDE, POTASSIUM CHLORIDE 236; 22.74; 6.74; 5.86; 2.97 G/4L; G/4L; G/4L; G/4L; G/4L
4 POWDER, FOR SOLUTION ORAL ONCE
Qty: 4000 ML | Refills: 0 | Status: SHIPPED | OUTPATIENT
Start: 2025-01-02 | End: 2025-01-02

## 2025-01-02 NOTE — TELEPHONE ENCOUNTER
Called patient. No answer, left VM that I was calling in reference to PREP medication for Colonoscopy and asked patient to please return my call. -BRIAN,LPN   Headache, extended menstrual cycle

## 2025-01-02 NOTE — TELEPHONE ENCOUNTER
Notify patient I reviewed with Dr. Paz regarding prep for colonoscopy and she recommended Golytely to help avoid electrolyte abnormalities that can happen with some of the other preps. I will send Golytely to her pharmacy.   MLC

## 2025-01-03 NOTE — TELEPHONE ENCOUNTER
Spoke with patient's daughter, Madhavi. Informed her of Golytely PREP medication for Colonoscopy per NBP to help avoid electrolyte abnormalities that can happen with some of the other preps. Madhavi was informed that medication was sent to patient's pharmacy.  Madhavi verbalized understanding of this information. -BRIAN,LPN

## 2025-01-23 RX ORDER — PANCRELIPASE LIPASE, PANCRELIPASE PROTEASE, PANCRELIPASE AMYLASE 40000; 126000; 168000 [USP'U]/1; [USP'U]/1; [USP'U]/1
CAPSULE, DELAYED RELEASE ORAL
Qty: 120 CAPSULE | Refills: 6 | Status: SHIPPED | OUTPATIENT
Start: 2025-01-23

## 2025-02-05 ENCOUNTER — TELEPHONE (OUTPATIENT)
Dept: GASTROENTEROLOGY | Facility: CLINIC | Age: 85
End: 2025-02-05
Payer: MEDICARE

## 2025-02-05 DIAGNOSIS — R63.4 WEIGHT LOSS: ICD-10-CM

## 2025-02-05 DIAGNOSIS — R93.3 ABNORMAL FINDING ON GI TRACT IMAGING: Primary | ICD-10-CM

## 2025-02-05 DIAGNOSIS — R19.7 DIARRHEA, UNSPECIFIED TYPE: ICD-10-CM

## 2025-02-05 NOTE — TELEPHONE ENCOUNTER
S/w pt 's daughter and told her that I was calling as a courtesy regarding pt's up coming COLON with NBP on 2/12/25, Wed and wanted to verify that she has her paper prep instructions and meds. Pt's daughter stated she has both.  I reminded her to have the pt start MiraLax this week a 2 day CLD the week before the procedure. Also, to not take Eliquis the day before the procedure. Pt's daughter acknowledge she understood.  I also mentioned that [facility] will call the day before (TUES) with the arrival time, GI Lab is located on the third floor, and to pre-register before next Tuesday. ramirez

## 2025-02-05 NOTE — TELEPHONE ENCOUNTER
"[facility]  [address] [place] [address]  Phone: [phone]  Fax: [phone]    History & Physical         Provider: Dr. Domitila Paz    Patient Name: Rochelle TORRES (age):1940  84 y.o.           Gender: female   Phone: [phone]     Referring Physician: Leonardo Raman     Vital Signs:   Height - 5' 2"  Weight - 135 lb  BMI -  24.69    Plan: Colonoscopy (sigmoid thickening on CT) w/Cbx @ [facility]    Encounter Diagnoses   Name Primary?    Abnormal finding on GI tract imaging Yes    Diarrhea, unspecified type     Weight loss            History:      Past Medical History:   Diagnosis Date    Allergies     Aortic stenosis     2024 moderate AS    CHF (congestive heart failure)     2024 ER 63%    GERD (gastroesophageal reflux disease)     Heart disease     History of gastroparesis     History of pulmonary embolism     History of small bowel obstruction     HTN (hypertension)     Hypoxia     Mixed hyperlipidemia     Parkinson's disease     Pneumonia, unspecified organism     Respiratory failure       Past Surgical History:   Procedure Laterality Date    ABDOMINAL ADHESION SURGERY      APPENDECTOMY      BLADDER SUSPENSION      CHOLECYSTECTOMY      EXPLORATORY LAPAROTOMY      closure of small bowel perforation    GASTRIC BYPASS      HYSTERECTOMY      REPAIR, LACERATION, ABDOMEN      SHOULDER OPEN ROTATOR CUFF REPAIR      VAGOTOMY        Medication List with Changes/Refills   Current Medications    ALBUTEROL (PROVENTIL/VENTOLIN HFA) 90 MCG/ACTUATION INHALER    Inhale 2 puffs into the lungs every 6 (six) hours as needed for Shortness of Breath or Wheezing.    AMLODIPINE (NORVASC) 2.5 MG TABLET    Take 2.5 mg by mouth once daily.    APIXABAN (ELIQUIS) 2.5 MG TAB    Take by mouth 2 (two) times daily.    ASPIRIN 81 MG CHEW    Take 81 mg by mouth once daily.    ATORVASTATIN (LIPITOR) 40 MG " TABLET    Take 40 mg by mouth once daily.    BENZONATATE (TESSALON) 100 MG CAPSULE    TAKE ONE CAPSULE BY MOUTH THREE TIMES DAILY FOR COUGH    BETA-CAROTENE,A,-VITS C,E/MINS (OCUVITE ORAL)    Take by mouth.    CARBAMAZEPINE (TEGRETOL XR) 100 MG 12 HR TABLET    Take 100 mg by mouth 2 (two) times daily.    CARBIDOPA-LEVODOPA-ENTACAPONE -200 MG (STALEVO) -200 MG TAB    Take 1 tablet by mouth 3 (three) times daily.    CEFDINIR (OMNICEF) 300 MG CAPSULE    TAKE ONE CAPSULE BY MOUTH TWICE A DAY THE FIRST FIVE DAYS OF MARCH, JUNE, SEPT AND DEC    FUROSEMIDE (LASIX) 20 MG TABLET    Take 20 mg by mouth 2 (two) times daily.    LEVOTHYROXINE (SYNTHROID) 25 MCG TABLET    Take 25 mcg by mouth before breakfast.    MELATONIN 3 MG CAP    Take 3 mg by mouth every evening.    MIRABEGRON (MYRBETRIQ) 25 MG TB24 ER TABLET    Take 25 mg by mouth once daily.    MONTELUKAST (SINGULAIR) 10 MG TABLET    Take 10 mg by mouth every evening.    ONDANSETRON (ZOFRAN) 8 MG TABLET    Take 8 mg by mouth every 8 (eight) hours as needed for Nausea.    PANTOPRAZOLE (PROTONIX) 40 MG TABLET    Take 1 tablet (40 mg total) by mouth once daily.    PREGABALIN (LYRICA) 75 MG CAPSULE    TAKE 1 CAPSULE BY MOUTH TWICE A DAY TAKE 1 BY MOUTH AT MIDDAY AND 1 BY MOUTH IN THE EVENING    SERTRALINE (ZOLOFT) 25 MG TABLET    Take 50 mg by mouth once daily.    SULFAMETHOXAZOLE-TRIMETHOPRIM 800-160MG (BACTRIM DS) 800-160 MG TAB    TAKE ONE TABLET BY MOUTH TWICE DAILY FOR THE FIRST FIVE DAYS OF FEB, MAY, AUGUST AND NOV    VALSARTAN (DIOVAN) 160 MG TABLET    Take 80 mg by mouth once daily.    VITAMIN D (VITAMIN D3) 1000 UNITS TAB    Take 5,000 Units by mouth once daily.    ZENPEP 40,000-126,000- 168,000 UNIT CPDR    TAKE 1 CAPSULE BY MOUTH WITH EACH MEAL AND SNACK      Review of patient's allergies indicates:   Allergen Reactions    Codeine Other (See Comments)     Hyper      Family History   Problem Relation Name Age of Onset    Hypertension Father      Lung  cancer Brother      Colon cancer Brother      Lung cancer Brother      Breast cancer Sister        Social History     Tobacco Use    Smoking status: Never     Passive exposure: Never    Smokeless tobacco: Never   Substance Use Topics    Alcohol use: Never    Drug use: Never        Physical Examination:     General Appearance:___________________________  HEENT: _____________________________________  Abdomen:____________________________________  Heart:________________________________________  Lungs:_______________________________________  Extremities:___________________________________  Skin:_________________________________________  Endocrine:____________________________________  Genitourinary:_________________________________  Neurological:__________________________________      Patient has been evaluated immediately prior to sedation and is medically cleared for endoscopy with IVCS as an ASA class: ______      Physician Signature: _________________________       Date: ________  Time: ________

## 2025-02-12 ENCOUNTER — OUTSIDE PLACE OF SERVICE (OUTPATIENT)
Dept: GASTROENTEROLOGY | Facility: CLINIC | Age: 85
End: 2025-02-12

## 2025-02-12 LAB — CRC RECOMMENDATION EXT: NORMAL

## 2025-02-17 ENCOUNTER — RESULTS FOLLOW-UP (OUTPATIENT)
Dept: GASTROENTEROLOGY | Facility: CLINIC | Age: 85
End: 2025-02-17
Payer: MEDICARE

## 2025-02-18 NOTE — TELEPHONE ENCOUNTER
Patient's daughter was notified of results and voiced understanding. Recall tab is up-to-date. DMP

## 2025-02-18 NOTE — TELEPHONE ENCOUNTER
CBx nl, 5 TA, 1 hyp, repeat colonoscopy evaluation (with 2d CLD and MiraLAX TID the week prior with GoLytely prep as was done for this procedure) in 3 years.     Notify patient. Confirm recall tab in appointment desk is up-to-date (update if needed).  NBP

## 2025-03-27 DIAGNOSIS — G20.A1 PARKINSON DISEASE: Primary | ICD-10-CM

## 2025-04-03 ENCOUNTER — DOCUMENTATION ONLY (OUTPATIENT)
Dept: GASTROENTEROLOGY | Facility: CLINIC | Age: 85
End: 2025-04-03
Payer: MEDICARE

## 2025-06-19 ENCOUNTER — TELEPHONE (OUTPATIENT)
Dept: GASTROENTEROLOGY | Facility: CLINIC | Age: 85
End: 2025-06-19
Payer: MEDICARE

## 2025-06-19 NOTE — TELEPHONE ENCOUNTER
Pre chart notes:    CBx nl, 5 TA, 1 hyp, repeat colonoscopy evaluation (with 2d CLD and MiraLAX TID the week prior with GoLytely prep as was done for this procedure) in 3 years.      Colonoscopy 8/19/2024: Pancolonic diverticulosis. Fecal contamination. 1 TA, 1 hyp, repeat colonoscopy with extended prep. two days of CLD, MiraLAX TID the week prior and regular colonoscopy prep. No Eliquis the day prior to colonoscopy.      CT AP IV 3/11/2024: stable intrahepatic/extrahepatic biliary ductal dilatation. Diverticulosis. Thickening of sigmoid. Possible ileus. Prominent main portal vein, fatty liver.     UGIS 5/12/2023: Postsurgical changes from a gastrojejunostomy with a widely patent surgical anastomosis. A complete small bowel follow-through could not be performed due to the patient's inability to ingest additional contrast. Esophageal dysmotility with dissipation of the primary peristaltic wave.       3/12/2024 GI stool PCR panel/C diff neg, fecal fat nl, CBC nl, 39H/45/132H, alb 3.2L, Tp 6.3L, CMP onl, TSH/FT4 nl, positive stool lactoferrin.  3/11/2024: CRP 0.60H, 84H/69H/170H, CMP onl, Lipase 102H (13-75), PT/INR/CBC nl     EGD 3/13/2024: Normal esophageal mucosa, esophagogastric junction, and proximal stomach. Surgically altered anatomy consistent with a prior gastroenteric anastomosis that has been closed with a 2nd patent gastroenteric anastomosis that appears to have an enteroenteric anastomosis just beyond the gastric lumen.  All appears well healed with normal overlying mucosa.  TLW

## 2025-06-24 ENCOUNTER — OFFICE VISIT (OUTPATIENT)
Dept: GASTROENTEROLOGY | Facility: CLINIC | Age: 85
End: 2025-06-24
Payer: MEDICARE

## 2025-06-24 VITALS
SYSTOLIC BLOOD PRESSURE: 135 MMHG | HEIGHT: 62 IN | OXYGEN SATURATION: 97 % | HEART RATE: 81 BPM | BODY MASS INDEX: 24.69 KG/M2 | DIASTOLIC BLOOD PRESSURE: 71 MMHG | WEIGHT: 134.19 LBS

## 2025-06-24 DIAGNOSIS — R19.7 DIARRHEA, UNSPECIFIED TYPE: ICD-10-CM

## 2025-06-24 DIAGNOSIS — Z98.84 H/O GASTRIC BYPASS: ICD-10-CM

## 2025-06-24 DIAGNOSIS — K31.84 GASTROPARESIS: ICD-10-CM

## 2025-06-24 DIAGNOSIS — R63.4 WEIGHT LOSS: ICD-10-CM

## 2025-06-24 DIAGNOSIS — R11.0 NAUSEA: Primary | ICD-10-CM

## 2025-06-24 DIAGNOSIS — Z80.0 FAMILY HISTORY OF COLON CANCER: ICD-10-CM

## 2025-06-24 DIAGNOSIS — Z86.0100 HISTORY OF COLON POLYPS: ICD-10-CM

## 2025-06-24 DIAGNOSIS — K83.8 COMMON BILE DUCT DILATATION: ICD-10-CM

## 2025-06-24 DIAGNOSIS — R11.2 NAUSEA AND VOMITING, UNSPECIFIED VOMITING TYPE: ICD-10-CM

## 2025-06-24 DIAGNOSIS — K83.8 INTRAHEPATIC BILE DUCT DILATION: ICD-10-CM

## 2025-06-24 PROCEDURE — 99214 OFFICE O/P EST MOD 30 MIN: CPT | Mod: S$PBB,,, | Performed by: INTERNAL MEDICINE

## 2025-06-24 RX ORDER — GABAPENTIN 100 MG/1
CAPSULE ORAL
COMMUNITY
Start: 2025-06-20

## 2025-06-24 RX ORDER — AZELASTINE 1 MG/ML
SPRAY, METERED NASAL
COMMUNITY
Start: 2025-06-18

## 2025-06-24 RX ORDER — PANTOPRAZOLE SODIUM 40 MG/1
40 TABLET, DELAYED RELEASE ORAL DAILY
Qty: 90 TABLET | Refills: 4 | Status: SHIPPED | OUTPATIENT
Start: 2025-06-24

## 2025-06-24 NOTE — PROGRESS NOTES
Clinic Note    Reason for visit:  The primary encounter diagnosis was Nausea. Diagnoses of Diarrhea, unspecified type, Weight loss, Nausea and vomiting, unspecified vomiting type, Intrahepatic bile duct dilation, Common bile duct dilatation, H/O gastric bypass, Gastroparesis, History of colon polyps, Family history of colon cancer, and Nausea and vomiting, unspecified vomiting type were also pertinent to this visit.    PCP: Leonardo Raman       HPI:  This is a 84 y.o. female who is here for a follow up. Patient's daughter present. Patient doing well on Zenpep 2 capsules with meals. Diarrhea resolved with pancreatic enzymes. She reports constant nausea. She has trigeminal neuralgia and they believe this is causing the nausea. She doesn't vomit. She was on Lyrica before and that seemed to work for trigeminal neuralgia but insurance stopped covering it so now on gabapentin but not working as well. Doesn't eat much. She does drink Ensure every morning. Eats ice cream twice a day. Nausea doesn't necessary get worse after eating. Nausea is worse in the morning and sometimes gets better as day goes. Daily BM.   She is taking panto 40 daily. Daughter takes Sucraid.   Eats ice cream once or twice a day.  Weight stable.     Colonoscopy 2/12/2025: sigmoid diverticulosis, IH. Small volume of fecal contamination after extended prep. CBx nl, 5 TA, 1 hyp, repeat colonoscopy evaluation (with 2d CLD and MiraLAX TID the week prior with GoLytely prep as was done for this procedure) in 3 years.     Colonoscopy 8/19/2024: Pancolonic diverticulosis. Fecal contamination. 1 TA, 1 hyp, repeat colonoscopy with extended prep. two days of CLD, MiraLAX TID the week prior and regular colonoscopy prep. No Eliquis the day prior to colonoscopy.      CT AP IV 3/11/2024: stable intrahepatic/extrahepatic biliary ductal dilatation. Diverticulosis. Thickening of sigmoid. Possible ileus. Prominent main portal vein, fatty liver.     UGIS 5/12/2023:  Postsurgical changes from a gastrojejunostomy with a widely patent surgical anastomosis. A complete small bowel follow-through could not be performed due to the patient's inability to ingest additional contrast. Esophageal dysmotility with dissipation of the primary peristaltic wave.       3/12/2024 GI stool PCR panel/C diff neg, fecal fat nl, CBC nl, 39H/45/132H, alb 3.2L, Tp 6.3L, CMP onl, TSH/FT4 nl, positive stool lactoferrin.  3/11/2024: CRP 0.60H, 84H/69H/170H, CMP onl, Lipase 102H (13-75), PT/INR/CBC nl     EGD 3/13/2024: Normal esophageal mucosa, esophagogastric junction, and proximal stomach. Surgically altered anatomy consistent with a prior gastroenteric anastomosis that has been closed with a 2nd patent gastroenteric anastomosis that appears to have an enteroenteric anastomosis just beyond the gastric lumen.  All appears well healed with normal overlying mucosa.        [facility]:  Previously on Dexilant 60 mg daily, Baclofen 10 mg BID, Vistaril 10 mg TID, Tigan 300 mg prn, Linzess 145 mcg, and GI cocktail+Donnatal prn. Linzess 290 mcg caused diarrhea.   EGD w/Dr. Almanza 4/10/2024: Evidence of gastrojejunostomy found in gastric antrum, in prepyloric region of stomach and in pylorus. Healthy appearing patent anastomosis. Medium amount of food (residue) found in greater curvature of stomach. Gastroparesis from prior vagotomy. 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  EGD/Colonoscopy w/Dr. Almanza 3/10/2010: Previous Tanisha-En-Y gastroplasty with evidence of mild esophagitis. Suture granuloma noted in gastric antrum. Diffuse melanosis coli, liquid stool in entire examined colon, interfering with visualization. IH. Repeat colon in 10 yr. EBx foveolar oxyntic mucosa w/diffuse mod chr infl        Records brought in by patient:  CT abd IV 2015: Fluid-filled thin-walled oversewn duodenal bulb noted. General dilatation of central biliary tree. Enlarged CBD visualized to  ampulla region w/o evidence of CBD mass or stone. Po    Review of Systems   Constitutional:  Positive for fatigue. Negative for fever and unexpected weight change.   HENT:  Negative for mouth sores, postnasal drip, sore throat and trouble swallowing.    Eyes:  Negative for pain, discharge and eye dryness.   Respiratory:  Positive for cough. Negative for apnea, choking, chest tightness, shortness of breath and wheezing.    Cardiovascular:  Negative for chest pain, palpitations and leg swelling.   Gastrointestinal:  Positive for abdominal pain and nausea. Negative for abdominal distention, anal bleeding, blood in stool, change in bowel habit, constipation, diarrhea, rectal pain, vomiting, reflux and fecal incontinence.   Genitourinary:  Negative for bladder incontinence, dysuria and hematuria.   Musculoskeletal:  Negative for arthralgias, back pain and joint swelling.   Integumentary:  Negative for color change and rash.   Allergic/Immunologic: Negative for environmental allergies and food allergies.   Neurological:  Positive for headaches. Negative for seizures.   Hematological:  Negative for adenopathy. Does not bruise/bleed easily.        Past Medical History:   Diagnosis Date    Allergies     Aortic stenosis     4/2024 moderate AS    CHF (congestive heart failure)     4/2024 ER 63%    GERD (gastroesophageal reflux disease)     Heart disease     History of gastroparesis     History of pulmonary embolism     History of small bowel obstruction     HTN (hypertension)     Hypoxia     Mixed hyperlipidemia     Parkinson's disease     Pneumonia, unspecified organism     Respiratory failure      Past Surgical History:   Procedure Laterality Date    ABDOMINAL ADHESION SURGERY      APPENDECTOMY      BLADDER SUSPENSION      CHOLECYSTECTOMY      EXPLORATORY LAPAROTOMY      closure of small bowel perforation    GASTRIC BYPASS      HYSTERECTOMY      REPAIR, LACERATION, ABDOMEN      SHOULDER OPEN ROTATOR CUFF REPAIR      VAGOTOMY   2006     Family History   Problem Relation Name Age of Onset    Hypertension Father      Breast cancer Sister      Lung cancer Brother      Colon cancer Brother      Lung cancer Brother      Ulcerative colitis Neg Hx      Crohn's disease Neg Hx      Liver disease Neg Hx      Pancreatic cancer Neg Hx      Stomach cancer Neg Hx      Throat cancer Neg Hx      Esophageal cancer Neg Hx       Social History[1]  Review of patient's allergies indicates:   Allergen Reactions    Codeine Other (See Comments)     Hyper        Medication List with Changes/Refills   Current Medications    ALBUTEROL (PROVENTIL/VENTOLIN HFA) 90 MCG/ACTUATION INHALER    Inhale 2 puffs into the lungs every 6 (six) hours as needed for Shortness of Breath or Wheezing.    APIXABAN (ELIQUIS) 2.5 MG TAB    Take by mouth 2 (two) times daily.    ASPIRIN 81 MG CHEW    Take 81 mg by mouth once daily.    ATORVASTATIN (LIPITOR) 40 MG TABLET    Take 40 mg by mouth once daily.    AZELASTINE (ASTELIN) 137 MCG (0.1 %) NASAL SPRAY    SPRAY 2 SPRAYS INTO EACH NOSTRIL TWICE A DAY    BENZONATATE (TESSALON) 100 MG CAPSULE    TAKE ONE CAPSULE BY MOUTH THREE TIMES DAILY FOR COUGH    CARBAMAZEPINE (TEGRETOL XR) 100 MG 12 HR TABLET    Take 100 mg by mouth 2 (two) times daily.    CARBIDOPA-LEVODOPA-ENTACAPONE -200 MG (STALEVO) -200 MG TAB    Take 1 tablet by mouth 3 (three) times daily.    CEFDINIR (OMNICEF) 300 MG CAPSULE    TAKE ONE CAPSULE BY MOUTH TWICE A DAY THE FIRST FIVE DAYS OF MARCH, JUNE, SEPT AND DEC    FUROSEMIDE (LASIX) 20 MG TABLET    Take 20 mg by mouth 2 (two) times daily.    GABAPENTIN (NEURONTIN) 100 MG CAPSULE        LEVOTHYROXINE (SYNTHROID) 25 MCG TABLET    Take 25 mcg by mouth before breakfast.    MELATONIN 3 MG CAP    Take 3 mg by mouth every evening.    MIRABEGRON (MYRBETRIQ) 25 MG TB24 ER TABLET    Take 25 mg by mouth once daily.    MONTELUKAST (SINGULAIR) 10 MG TABLET    Take 10 mg by mouth every evening.    ONDANSETRON (ZOFRAN) 8 MG  "TABLET    Take 8 mg by mouth every 8 (eight) hours as needed for Nausea.    SERTRALINE (ZOLOFT) 25 MG TABLET    Take 50 mg by mouth once daily.    SULFAMETHOXAZOLE-TRIMETHOPRIM 800-160MG (BACTRIM DS) 800-160 MG TAB    TAKE ONE TABLET BY MOUTH TWICE DAILY FOR THE FIRST FIVE DAYS OF FEB, MAY, AUGUST AND NOV    VALSARTAN (DIOVAN) 160 MG TABLET    Take 80 mg by mouth once daily.    VITAMIN D (VITAMIN D3) 1000 UNITS TAB    Take 5,000 Units by mouth once daily.    ZENPEP 40,000-126,000- 168,000 UNIT CPDR    TAKE 1 CAPSULE BY MOUTH WITH EACH MEAL AND SNACK   Changed and/or Refilled Medications    Modified Medication Previous Medication    PANTOPRAZOLE (PROTONIX) 40 MG TABLET pantoprazole (PROTONIX) 40 MG tablet       Take 1 tablet (40 mg total) by mouth once daily.    Take 1 tablet (40 mg total) by mouth once daily.   Discontinued Medications    AMLODIPINE (NORVASC) 2.5 MG TABLET    Take 2.5 mg by mouth once daily.    BETA-CAROTENE,A,-VITS C,E/MINS (OCUVITE ORAL)    Take by mouth.    PREGABALIN (LYRICA) 75 MG CAPSULE    TAKE 1 CAPSULE BY MOUTH TWICE A DAY TAKE 1 BY MOUTH AT MIDDAY AND 1 BY MOUTH IN THE EVENING         Vital Signs:  /71 (BP Location: Left arm, Patient Position: Sitting)   Pulse 81   Ht 5' 2" (1.575 m)   Wt 60.9 kg (134 lb 3.2 oz)   SpO2 97%   BMI 24.55 kg/m²         Physical Exam  Vitals reviewed.   Constitutional:       General: She is awake. She is not in acute distress.     Appearance: Normal appearance. She is well-developed. She is not ill-appearing, toxic-appearing or diaphoretic.   HENT:      Head: Normocephalic and atraumatic.      Nose: Nose normal.      Mouth/Throat:      Mouth: Mucous membranes are moist.      Pharynx: Oropharynx is clear. No oropharyngeal exudate or posterior oropharyngeal erythema.   Eyes:      General: Lids are normal. Gaze aligned appropriately. No scleral icterus.        Right eye: No discharge.         Left eye: No discharge.      Conjunctiva/sclera: Conjunctivae " normal.   Neck:      Trachea: Trachea normal.      Comments: Mild kyphosis  Cardiovascular:      Rate and Rhythm: Normal rate and regular rhythm.      Pulses:           Radial pulses are 2+ on the right side and 2+ on the left side.   Pulmonary:      Effort: Pulmonary effort is normal. No respiratory distress.      Breath sounds: No stridor. No wheezing.   Chest:      Chest wall: No tenderness.   Abdominal:      General: Bowel sounds are normal. There is distension.      Palpations: Abdomen is soft. There is no fluid wave, hepatomegaly or mass.      Tenderness: There is no abdominal tenderness. There is no guarding or rebound.      Comments: Mildly distended with air   Musculoskeletal:         General: No tenderness or deformity.      Cervical back: Full passive range of motion without pain and neck supple. No tenderness.      Right lower leg: No edema.      Left lower leg: No edema.   Lymphadenopathy:      Cervical: No cervical adenopathy.   Skin:     General: Skin is warm and dry.      Capillary Refill: Capillary refill takes less than 2 seconds.      Coloration: Skin is not cyanotic, jaundiced or pale.   Neurological:      General: No focal deficit present.      Mental Status: She is alert and oriented to person, place, and time.      Motor: No tremor.   Psychiatric:         Attention and Perception: Attention normal.         Mood and Affect: Mood and affect normal.         Speech: Speech normal.         Behavior: Behavior normal. Behavior is cooperative.            All of the data above and below has been reviewed by myself and any further interpretations will be reflected in the assessment and plan.   The data includes review of external notes, and independent interpretation of lab results, procedures, x-rays, and imaging reports.      Assessment:  Nausea    Diarrhea, unspecified type    Weight loss    Nausea and vomiting, unspecified vomiting type  -     pantoprazole (PROTONIX) 40 MG tablet; Take 1 tablet (40  mg total) by mouth once daily.  Dispense: 90 tablet; Refill: 4    Intrahepatic bile duct dilation  Comments:  stable on CT 3/2024    Common bile duct dilatation    H/O gastric bypass  -     pantoprazole (PROTONIX) 40 MG tablet; Take 1 tablet (40 mg total) by mouth once daily.  Dispense: 90 tablet; Refill: 4    Gastroparesis    History of colon polyps    Family history of colon cancer    Nausea and vomiting, unspecified vomiting type  Comments:  nausea persists, improves with Zofran, no vomiting  Orders:  -     pantoprazole (PROTONIX) 40 MG tablet; Take 1 tablet (40 mg total) by mouth once daily.  Dispense: 90 tablet; Refill: 4    Repeat colonoscopy evaluation in 2028 with 2d CLD and MiraLAX TID the week prior with GoLytely prep.   Diarrhea resolved on Zenpep.   Nausea, may be due to trigeminal neuralgia. Main complaint but not additional clear source from a GI standpoint other than her prior GI surgeries and associated adhesions.      Recommendations:    Continue Zenpep with each meal and snack.   Drink 2-3 Ensure's per day in addition to your meals.   Continue pantoprazole 40 mg daily.    If any tests, procedures, or imaging has been ordered and you are not contacted to schedule within 1-2 weeks, then you may call the central scheduling department directly at [phone].     Risks, benefits, and alternatives of medical management, any associated procedures, and/or treatment discussed with the patient. Patient given opportunity to ask questions and voices understanding. Patient has elected to proceed with the recommended care modalities as discussed.    Instructed patient to notify my office if they have not been contacted within two weeks after any procedures, submitting any samples (biopsies, blood, stool, urine, etc.) or after any imaging (X-ray, CT, MRI, etc.).     Follow up in about 6 months (around 12/24/2025).    Order summary:  No orders of the defined types were placed in this encounter.     This  assessment, plan, and documentation was performed in collaboration with Olivia Hernandez NP.     This document may have been created using a voice recognition transcribing system. Incorrect words or phrases may have been missed during proofreading. Please interpret accordingly or contact me for clarification.     Domitila Paz MD         [1]   Social History  Tobacco Use    Smoking status: Never     Passive exposure: Never    Smokeless tobacco: Never   Substance Use Topics    Alcohol use: Never    Drug use: Never

## 2025-06-24 NOTE — PATIENT INSTRUCTIONS
Continue Zenpep with each meal and snack.   Drink 2-3 Ensure's per day in addition to your meals.   Continue pantoprazole 40 mg daily.

## 2025-06-24 NOTE — LETTER
June 24, 2025        Leonardo Raman MD  [address]  [place] [address]             [facility]  [address] [place] [address]  Phone: [phone]  Fax: [phone]   Patient: Rochelle Mckeon   MR Number: [id number]   YOB: 1940   Date of Visit: 6/24/2025       Dear Dr. Raman:    Thank you for referring Rochelle Mckeon to me for evaluation. Attached you will find relevant portions of my assessment and plan of care.    If you have questions, please do not hesitate to call me. I look forward to following Rochelle Mckeon along with you.    Sincerely,      Domitila Paz MD            CC  No Recipients    Enclosure

## 2025-07-10 ENCOUNTER — OFFICE VISIT (OUTPATIENT)
Dept: CARDIOTHORACIC SURGERY | Facility: CLINIC | Age: 85
End: 2025-07-10
Payer: MEDICARE

## 2025-07-10 VITALS
HEART RATE: 93 BPM | WEIGHT: 134.88 LBS | HEIGHT: 62 IN | RESPIRATION RATE: 18 BRPM | SYSTOLIC BLOOD PRESSURE: 142 MMHG | BODY MASS INDEX: 24.82 KG/M2 | DIASTOLIC BLOOD PRESSURE: 79 MMHG

## 2025-07-10 DIAGNOSIS — I35.0 NONRHEUMATIC AORTIC VALVE STENOSIS: Primary | ICD-10-CM

## 2025-07-10 DIAGNOSIS — I10 ESSENTIAL HYPERTENSION, BENIGN: ICD-10-CM

## 2025-07-10 PROCEDURE — 99204 OFFICE O/P NEW MOD 45 MIN: CPT | Mod: S$PBB,FS,, | Performed by: THORACIC SURGERY (CARDIOTHORACIC VASCULAR SURGERY)

## 2025-07-12 NOTE — PROGRESS NOTES
Subjective:      Patient ID: Rochelle Mckeon is a 84 y.o. female who presents for evaluation of Aortic Stenosis    Chief Complaint: TAVR consult    HPI 84-year-old female  with past medical history significant for aortic stenosis, hypertension, hyperlipidemia, hypothyroidism, IBS, post herpetic neuralgia, trigeminal neuralgia, interstitial lung disease (followed by Dr. Raman), history of pulmonary embolism x2 on Eliquis, history of pulmonary nodule, asthma, Parkinson's presents For surgical evaluation of Aortic Stenosis.  Patient is referred by her cardiologist Dr. Rodriguez/ Dr. Murillo.  Symptoms have included worsening dyspnea.  She denies any chest pain, presyncope, dizziness, or lower extremity swelling. STS score 5.60%.  Frailty 1/4    TTE 07/10/2025 LV ID 3.6, aortic valve mean gradient 39.68 mm Hg, aortic valve V max 4.05 m/sec, aortic valve area 0.5 no AI, mitral valve no stenosis trace regurg, RVSP 43 mm Hg, ejection fraction 60 65%  CT chest 07/07/2025 stable 4 mm middle lobe nodule.  Scattered areas of mild parenchymal fibrosis.  CCTA 07/04/2025 moderate to severe AS, probable nonobstructive coronary arthrosclerosis, normal left ventricular function, moderate right ventricular enlargement.  CTA abdomen and pelvis 07/03/2025 no evidence of AS, severe stenosis of the proximal right renal artery.  Fatty infiltration of liver.  Diverticulosis of the descending and sigmoid colon.  Pulmonary function test 07/03/2025 FEV 11.03 or 60% predicted, DLCO of 9.36 or 54% predicted.  Twelve lead EKG 07/03/2025 normal sinus rhythm  left anterior fascicular block  TAVR planning 23 mm S3 with 11% over sizing via transfemoral approach  Carotid ultrasound 7/3/25- 50-69% stenosis of the left proximal ICA  Lower extremity arterial ultrasound (. . .)  CT chest (...)  Review of Systems   Constitutional: Positive for malaise/fatigue. Negative for chills, fever, weight gain and weight loss.   HENT:  Negative for ear pain.     Eyes:  Negative for blurred vision, double vision and photophobia.   Cardiovascular:  Positive for dyspnea on exertion. Negative for chest pain, leg swelling and orthopnea.   Respiratory:  Positive for shortness of breath. Negative for wheezing.    Skin:  Negative for flushing and poor wound healing.   Musculoskeletal:  Negative for falls, joint pain and joint swelling.   Gastrointestinal:  Negative for abdominal pain, diarrhea and dysphagia.   Genitourinary:  Negative for dysuria and hesitancy.   Neurological:  Negative for numbness, paresthesias and weakness.   Psychiatric/Behavioral:  Negative for memory loss. The patient is not nervous/anxious.       Past Medical History:   Diagnosis Date    Allergies     Aortic stenosis     4/2024 moderate AS    CHF (congestive heart failure)     4/2024 ER 63%    GERD (gastroesophageal reflux disease)     Heart disease     History of gastroparesis     History of pulmonary embolism     History of small bowel obstruction     HTN (hypertension)     Hypoxia     Mixed hyperlipidemia     Parkinson's disease     Pneumonia, unspecified organism     Respiratory failure       Past Surgical History:   Procedure Laterality Date    ABDOMINAL ADHESION SURGERY      APPENDECTOMY      BLADDER SUSPENSION      CHOLECYSTECTOMY      EXPLORATORY LAPAROTOMY      closure of small bowel perforation    GASTRIC BYPASS      HYSTERECTOMY      REPAIR, LACERATION, ABDOMEN      SHOULDER OPEN ROTATOR CUFF REPAIR      VAGOTOMY  2006      Family History   Problem Relation Name Age of Onset    Hypertension Father      Breast cancer Sister      Lung cancer Brother      Colon cancer Brother      Lung cancer Brother      Ulcerative colitis Neg Hx      Crohn's disease Neg Hx      Liver disease Neg Hx      Pancreatic cancer Neg Hx      Stomach cancer Neg Hx      Throat cancer Neg Hx      Esophageal cancer Neg Hx        Social History     Socioeconomic History    Marital status:    Tobacco Use    Smoking  status: Never     Passive exposure: Never    Smokeless tobacco: Never   Substance and Sexual Activity    Alcohol use: Never    Drug use: Never    Sexual activity: Not Currently        Medication List with Changes/Refills   Current Medications    ALBUTEROL (PROVENTIL/VENTOLIN HFA) 90 MCG/ACTUATION INHALER    Inhale 2 puffs into the lungs every 6 (six) hours as needed for Shortness of Breath or Wheezing.    APIXABAN (ELIQUIS) 2.5 MG TAB    Take by mouth 2 (two) times daily.    ASPIRIN 81 MG CHEW    Take 81 mg by mouth once daily.    ATORVASTATIN (LIPITOR) 40 MG TABLET    Take 40 mg by mouth once daily.    AZELASTINE (ASTELIN) 137 MCG (0.1 %) NASAL SPRAY    SPRAY 2 SPRAYS INTO EACH NOSTRIL TWICE A DAY    BENZONATATE (TESSALON) 100 MG CAPSULE    TAKE ONE CAPSULE BY MOUTH THREE TIMES DAILY FOR COUGH    CARBAMAZEPINE (TEGRETOL XR) 100 MG 12 HR TABLET    Take 100 mg by mouth 2 (two) times daily.    CARBIDOPA-LEVODOPA-ENTACAPONE -200 MG (STALEVO) -200 MG TAB    Take 1 tablet by mouth 3 (three) times daily.    CEFDINIR (OMNICEF) 300 MG CAPSULE    TAKE ONE CAPSULE BY MOUTH TWICE A DAY THE FIRST FIVE DAYS OF MARCH, JUNE, SEPT AND DEC    FUROSEMIDE (LASIX) 20 MG TABLET    Take 20 mg by mouth 2 (two) times daily.    GABAPENTIN (NEURONTIN) 100 MG CAPSULE        LEVOTHYROXINE (SYNTHROID) 25 MCG TABLET    Take 25 mcg by mouth before breakfast.    MELATONIN 3 MG CAP    Take 3 mg by mouth every evening.    MIRABEGRON (MYRBETRIQ) 25 MG TB24 ER TABLET    Take 25 mg by mouth once daily.    MONTELUKAST (SINGULAIR) 10 MG TABLET    Take 10 mg by mouth every evening.    ONDANSETRON (ZOFRAN) 8 MG TABLET    Take 8 mg by mouth every 8 (eight) hours as needed for Nausea.    PANTOPRAZOLE (PROTONIX) 40 MG TABLET    Take 1 tablet (40 mg total) by mouth once daily.    SERTRALINE (ZOLOFT) 25 MG TABLET    Take 50 mg by mouth once daily.    SULFAMETHOXAZOLE-TRIMETHOPRIM 800-160MG (BACTRIM DS) 800-160 MG TAB    TAKE ONE TABLET BY MOUTH  "TWICE DAILY FOR THE FIRST FIVE DAYS OF FEB, MAY, AUGUST AND NOV    VALSARTAN (DIOVAN) 160 MG TABLET    Take 80 mg by mouth once daily.    VITAMIN D (VITAMIN D3) 1000 UNITS TAB    Take 5,000 Units by mouth once daily.    ZENPEP 40,000-126,000- 168,000 UNIT CPDR    TAKE 1 CAPSULE BY MOUTH WITH EACH MEAL AND SNACK        Objective:     BP (!) 142/79 (BP Location: Left arm, Patient Position: Sitting)   Pulse 93   Resp 18   Ht 5' 2" (1.575 m)   Wt 61.2 kg (134 lb 14.4 oz)   BMI 24.67 kg/m²     Physical Exam  HENT:      Head: Normocephalic.      Nose: Nose normal.      Mouth/Throat:      Mouth: Mucous membranes are moist.   Eyes:      Pupils: Pupils are equal, round, and reactive to light.   Cardiovascular:      Rate and Rhythm: Normal rate and regular rhythm.      Heart sounds: Murmur heard.   Pulmonary:      Effort: Pulmonary effort is normal.   Abdominal:      General: Abdomen is flat.      Palpations: Abdomen is soft.   Musculoskeletal:         General: Normal range of motion.      Cervical back: Normal range of motion.   Skin:     General: Skin is warm.      Capillary Refill: Capillary refill takes less than 2 seconds.   Neurological:      Mental Status: She is alert and oriented to person, place, and time.   Psychiatric:         Mood and Affect: Mood normal.                Assessment & Plan:   Severe Symptomatic Aortic Stenosis  Interstitial Lung Disease/ HX PEx2 on Eliquis/ pulmonary nodule/ asthma  Parkinson's Disease  Hypothyroidism  IBS  post herpetic neuralgia/ trigeminal neuralgia  Hypertension/ hyperlipidemia,  50-69% stenosis of the left proximal ICA    7/10/25 seen and examined with Dr. Carr.  Long discussion about shortness of breath with her chronic respiratory insufficiency versus valvular disorder.  She meets criteria for severe symptomatic AS.  Discussed with the patient that since her lung function has been stable in her aortic valve has progressed to severe likely aortic stenosis is the " culprit.  We can not ascertain that all of her shortness of breath will improve however due to her chronic lung disease.  Discussed risks and benefits of procedure and patient is willing to proceed.  She is a full rescue.  She is best suited for a TAVR procedure.    Massimo Bowers FNP-C  Cardiothoracic Surgery

## 2025-07-15 ENCOUNTER — OUTSIDE PLACE OF SERVICE (OUTPATIENT)
Dept: CARDIOTHORACIC SURGERY | Facility: CLINIC | Age: 85
End: 2025-07-15
Payer: MEDICARE

## 2025-07-17 ENCOUNTER — OUTSIDE PLACE OF SERVICE (OUTPATIENT)
Dept: PULMONOLOGY | Facility: CLINIC | Age: 85
End: 2025-07-17
Payer: MEDICARE

## 2025-07-17 PROCEDURE — 99223 1ST HOSP IP/OBS HIGH 75: CPT | Mod: S$PBB,,, | Performed by: INTERNAL MEDICINE
